# Patient Record
Sex: FEMALE | Race: WHITE | NOT HISPANIC OR LATINO | Employment: UNEMPLOYED | URBAN - NONMETROPOLITAN AREA
[De-identification: names, ages, dates, MRNs, and addresses within clinical notes are randomized per-mention and may not be internally consistent; named-entity substitution may affect disease eponyms.]

---
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
## 2023-02-15 NOTE — PROGRESS NOTES
Assessment & Plan     ELIZABETH (generalized anxiety disorder)  Depression, unspecified depression type  Mixed obsessional thoughts and acts  Worsening anxiety, stop Celexa as she felt it was not working.  Has failed Lexapro and Zoloft in the past  Concerned about OCD with concurrent anxiety and depression.  A lot of thoughts compulsions, followed by need to complete actions.  Recommend trial of Prozac.  Discussed risk and benefits.  Start at 10 mg, increase to 20 mg at 3 weeks if minimal benefit and tolerating.  Recommended therapy and diagnostic assessment, referral placed to [facility].  Consider pharmacogenetics testing if Prozac fails.  Follow-up in 4 weeks, sooner if needed.  - FLUoxetine (PROZAC) 10 MG capsule; Take 1 capsule (10 mg) by mouth daily  - Adult Mental Health  Referral; Future    Iron deficiency  Restart iron supplement.  Recheck iron level at next appointment.  Consider something for heavy menses, progesterone only birth control, in the future.  - ferrous sulfate (FEROSUL) 325 (65 Fe) MG tablet; Take 1 tablet (325 mg) by mouth every other day Do not take with PPI or antacid (take Iron at least two hours before or four hours after antacid/PPI). Try to take with Orange juice or vitamin C to increase absorption. Do not take at same time as milk, calcium supplement, fiber, tea, coffee or eggs to increase absorption.    Vitamin D deficiency  Resume vitamin D supplement.  We will recheck level at next appointment.  - vitamin D3 (CHOLECALCIFEROL) 50 mcg (2000 units) tablet; Take 1 tablet (50 mcg) by mouth daily       Depression Screening Follow Up    PHQ 2/16/2023   PHQ-9 Total Score 14   Q9: Thoughts of better off dead/self-harm past 2 weeks Not at all   PHQ-A Total Score -   PHQ-A Depressed most days in past year -   PHQ-A Mood affect on daily activities -   PHQ-A Suicide Ideation past 2 weeks -   PHQ-A Suicide Ideation past month -   PHQ-A Previous suicide attempt -       Follow Up Actions  Taken  Crisis resource information provided in After Visit Summary  Mental Health Referral placed  Follow up recommended: new medication, fu planned       Return in about 4 weeks (around 3/16/2023) for Follow up, mental health.    Mago Perez MD  [facility] - MARIAM Penny is a 17 year old, presenting for the following health issues:  Anxiety      HPI     Anxiety Follow-Up    How are you doing with your anxiety since your last visit? Worsened- would also like to talk bout OCD     Are you having other symptoms that might be associated with anxiety? Yes:  not sleeping enough     Have you had a significant life event? No     Are you feeling depressed? Yes:  every day     Do you have any concerns with your use of alcohol or other drugs? No    Has been on multiple anxiety medications - feels like Celexa stopped working. Quit, then restarted and did not notice any difference. Was on for 2-3 years.   Has tried lexapro and Zoloft - didn't work well. Had increased suicidal thoughts. Tried wellbutrin before too.     Very anxious all the time - stomach hurts, diarrhea, nausea. Being out in public is hard, going far from home, being around strangers.     Depression has been pervasive since 5th grade. Has been isolated, allergic to sun.     Feels like she has OCD. Repetitive impulses thoughts, and she has to do them. Knocking on things, fear of germs. Has been shoving her finger in her nose, brain says she will die if she doesn't do it. Has never seen a therapist.     Does get heavy periods. Iron deficient in past. Ran out of iron.     Social History     Tobacco Use     Smoking status: Never     Smokeless tobacco: Never   Substance Use Topics     Alcohol use: Never     Alcohol/week: 0.0 standard drinks     Drug use: Never     ELIZABETH-7 SCORE 6/24/2021 11/16/2022 2/16/2023   Total Score 16 18 18     PHQ 6/24/2021 11/16/2022 2/16/2023   PHQ-9 Total Score 18 14 14   Q9: Thoughts of better off  "dead/self-harm past 2 weeks Not at all Not at all Not at all   PHQ-A Total Score - - -   PHQ-A Depressed most days in past year - - -   PHQ-A Mood affect on daily activities - - -   PHQ-A Suicide Ideation past 2 weeks - - -   PHQ-A Suicide Ideation past month - - -   PHQ-A Previous suicide attempt - - -     Last PHQ-9 2/16/2023   1.  Little interest or pleasure in doing things 3   2.  Feeling down, depressed, or hopeless 2   3.  Trouble falling or staying asleep, or sleeping too much 3   4.  Feeling tired or having little energy 2   5.  Poor appetite or overeating 0   6.  Feeling bad about yourself 2   7.  Trouble concentrating 2   8.  Moving slowly or restless 0   Q9: Thoughts of better off dead/self-harm past 2 weeks 0   PHQ-9 Total Score 14   Difficulty at work, home, or with people Not difficult at all     ELIZABETH-7  2/16/2023   1. Feeling nervous, anxious, or on edge 3   2. Not being able to stop or control worrying 3   3. Worrying too much about different things 3   4. Trouble relaxing 2   5. Being so restless that it is hard to sit still 2   6. Becoming easily annoyed or irritable 2   7. Feeling afraid, as if something awful might happen 3   ELIZABETH-7 Total Score 18   If you checked any problems, how difficult have they made it for you to do your work, take care of things at home, or get along with other people? Not difficult at all         Objective    BP 98/69 (BP Location: Left arm, Patient Position: Sitting, Cuff Size: Adult Regular)   Pulse 86   Temp 98  F (36.7  C) (Tympanic)   Ht 1.575 m (5' 2\")   Wt 48.7 kg (107 lb 4.8 oz)   SpO2 99%   BMI 19.63 kg/m    Body mass index is 19.63 kg/m .     Physical Exam  Constitutional:       General: She is not in acute distress.     Appearance: Normal appearance. She is not ill-appearing.   Pulmonary:      Effort: Pulmonary effort is normal.   Skin:     General: Skin is warm and dry.   Neurological:      General: No focal deficit present.      Mental Status: She is " alert.   Psychiatric:         Mood and Affect: Mood normal.         Behavior: Behavior normal.         Thought Content: Thought content normal.         Judgment: Judgment normal.

## 2023-02-16 ENCOUNTER — OFFICE VISIT (OUTPATIENT)
Dept: FAMILY MEDICINE | Facility: OTHER | Age: 18
End: 2023-02-16
Attending: STUDENT IN AN ORGANIZED HEALTH CARE EDUCATION/TRAINING PROGRAM
Payer: COMMERCIAL

## 2023-02-16 VITALS
DIASTOLIC BLOOD PRESSURE: 69 MMHG | SYSTOLIC BLOOD PRESSURE: 98 MMHG | TEMPERATURE: 98 F | WEIGHT: 107.3 LBS | BODY MASS INDEX: 19.75 KG/M2 | OXYGEN SATURATION: 99 % | HEIGHT: 62 IN | HEART RATE: 86 BPM

## 2023-02-16 DIAGNOSIS — F32.A DEPRESSION, UNSPECIFIED DEPRESSION TYPE: ICD-10-CM

## 2023-02-16 DIAGNOSIS — F42.2 MIXED OBSESSIONAL THOUGHTS AND ACTS: ICD-10-CM

## 2023-02-16 DIAGNOSIS — F41.1 GAD (GENERALIZED ANXIETY DISORDER): Primary | ICD-10-CM

## 2023-02-16 DIAGNOSIS — E55.9 VITAMIN D DEFICIENCY: ICD-10-CM

## 2023-02-16 DIAGNOSIS — E61.1 IRON DEFICIENCY: ICD-10-CM

## 2023-02-16 PROBLEM — N92.0 MENORRHAGIA: Status: ACTIVE | Noted: 2023-02-16

## 2023-02-16 PROCEDURE — 99214 OFFICE O/P EST MOD 30 MIN: CPT | Performed by: STUDENT IN AN ORGANIZED HEALTH CARE EDUCATION/TRAINING PROGRAM

## 2023-02-16 RX ORDER — FLUOXETINE 10 MG/1
10 CAPSULE ORAL DAILY
Qty: 30 CAPSULE | Refills: 1 | Status: SHIPPED | OUTPATIENT
Start: 2023-02-16 | End: 2023-03-27

## 2023-02-16 RX ORDER — FERROUS SULFATE 325(65) MG
325 TABLET ORAL EVERY OTHER DAY
Qty: 30 TABLET | Refills: 1 | Status: SHIPPED | OUTPATIENT
Start: 2023-02-16 | End: 2023-06-07

## 2023-02-16 RX ORDER — CHOLECALCIFEROL (VITAMIN D3) 50 MCG
1 TABLET ORAL DAILY
Qty: 90 TABLET | Refills: 1 | Status: SHIPPED | OUTPATIENT
Start: 2023-02-16 | End: 2023-08-24

## 2023-02-16 ASSESSMENT — ANXIETY QUESTIONNAIRES
6. BECOMING EASILY ANNOYED OR IRRITABLE: MORE THAN HALF THE DAYS
1. FEELING NERVOUS, ANXIOUS, OR ON EDGE: NEARLY EVERY DAY
GAD7 TOTAL SCORE: 18
5. BEING SO RESTLESS THAT IT IS HARD TO SIT STILL: MORE THAN HALF THE DAYS
GAD7 TOTAL SCORE: 18
2. NOT BEING ABLE TO STOP OR CONTROL WORRYING: NEARLY EVERY DAY
IF YOU CHECKED OFF ANY PROBLEMS ON THIS QUESTIONNAIRE, HOW DIFFICULT HAVE THESE PROBLEMS MADE IT FOR YOU TO DO YOUR WORK, TAKE CARE OF THINGS AT HOME, OR GET ALONG WITH OTHER PEOPLE: NOT DIFFICULT AT ALL
3. WORRYING TOO MUCH ABOUT DIFFERENT THINGS: NEARLY EVERY DAY
7. FEELING AFRAID AS IF SOMETHING AWFUL MIGHT HAPPEN: NEARLY EVERY DAY

## 2023-02-16 ASSESSMENT — PATIENT HEALTH QUESTIONNAIRE - PHQ9
SUM OF ALL RESPONSES TO PHQ QUESTIONS 1-9: 14
5. POOR APPETITE OR OVEREATING: MORE THAN HALF THE DAYS

## 2023-02-16 ASSESSMENT — PAIN SCALES - GENERAL: PAINLEVEL: NO PAIN (0)

## 2023-02-16 NOTE — PATIENT INSTRUCTIONS
Start Prozac 10mg once daily. If at 2-3 weeks, no change in any symptoms, increase to 20mg and let me know via [facility] [employer]. Follow-up at 4 weeks. Reach out sooner with any concerns.   Start vitamin D again. Will check levels at next appointment.   Start iron again. Will check at next appointment.         Psychologists/ Counselors                        Haroldo Orourke          ...            ..[phone]  [facility]                    ..  [phone]  [facility]                 [phone]  [facility] (kids)       .         [phone]  [facility](teens)                ..[phone]  [facility]                    [phone]  [facility]                   [phone]  [facility]           ...      .. [phone]  [facility]                ...[phone]  [facility]     ...          ...[phone]  [facility]               [phone]   [facility]..            .[phone]  [facility]                       [phone]  [facility]               .    [phone]  [facility]                ..[phone]  [facility]     .      . ..[phone]     [facility]              .   [phone]  [facility]                   [phone]              [facility]              .   [phone]  [facility]              .  ...[phone]  [facility]              .   ..[phone]  [facility]           ...      .. [phone]  [facility]              . ..[phone]  [facility]              .    ..[phone]  [facility]              .     .. [phone]  [facility]                 ... [phone]  [facility]                         .[phone]  [facility]     .      . ..[phone]            [facility]              ..  [phone]                    [place]             . ... ..[phone]  [facility]              .      ..[phone]  [facility]              .     ..[phone]  [facility]        .      . [phone]  [facility]           .[phone]  [facility]         .    ..[phone]  [facility]             . ..  ..  [phone]  [facility]            [phone]  [facility]              ..[phone]  [facility]                     [phone]    [facility]           ..     .[phone]     [facility]    ...     ...[phone]     [facility]            [phone]                                                  [facility]                ..  .  [phone]  [facility]         .     .  [phone]  [facility] M[facility]ARITA Guz[facility]              . [phone]  [facility]  ..        [phone]  [facility]              .    [phone]    [facility]               ...  .  [phone]  [facility]             . [phone]  [facility]            [phone]  [facility]     .      . ..[phone]         *Facilities in bold italics indicate medication management  Services are offered.     Crisis support  Mobile crisis line- [phone]  [facility]: Free online resources including therapy for Mental Health and substance use problems: Http://thriverange.org     Crisis Text Line  Text HOME to 629-454 - The Crisis Text Line serves anyone, in any type of crisis, providing access to free, 24/7 support and information via the medium people already use and trust  http://www.crisistextline.org   Here's how it works:  Text HOME to 984-887 from anywhere in the [place], anytime, about any type of crisis.  A live, trained Crisis Counselor  receives the text and responds quickly.  The volunteer Crisis Counselor will help you move from a 'hot moment to a cool moment'

## 2023-03-23 NOTE — PROGRESS NOTES
Assessment & Plan     Encounter to establish care  Reviewed medial history and problems.     ELIZABETH (generalized anxiety disorder)  Depression, unspecified depression type  15 % improvement per patient, mom has noticed also improvement with fluoxetine.  Still room for improvement, plan to increase to 20 mg.  She is engaging in counseling, did receive formal OCD diagnosis,  Although counselor is looking for someone for her that does specialize in OCD.  Tolerating fluoxetine with no adverse effects.  Follow-up again in 4 weeks, sooner if needed.  - FLUoxetine (PROZAC) 20 MG capsule; Take 1 capsule (20 mg) by mouth daily    Mixed obsessional thoughts and acts  Will benefit from counseling, in addition to slightly higher dose of Prozac.    EPP (erythropoietic protoporphyria) (H)  Reviewed history.  Requires annual monitoring of levels, LFTs, CBC, and ferritin.  Also found vitamin D deficiency, is on supplementation.  New referral placed [facility] hematology at [facility].  Does also need to have her gallbladder out for gallstones, phone number given for general surgery so she can reschedule this.  Update labs today, CBC and CMP were done in November 2022.  - Adult Oncology/Hematology  Referral; Future  - Vitamin D Deficiency  - Ferritin    Pulsatile tinnitus of right ear  Concern for vascular etiology.  Unclear if hearing loss component, that she has not noticed, but does have the unilateral pulsing tinnitus ongoing for 2 years.  Recommend audiology testing, MRI of the auditory canal, and ENT evaluation.  Mom and patient were in agreement.  - Adult Audiology  Referral; Future  - MR Angiography [place] of card  - Adult ENT  Referral; Future      Mago Perez MD  [facility] - MARIAM Penny is a 17 year old, presenting for the following health issues:  Establish Care, Depression, and Anxiety    HPI   Establish care     Depression and Anxiety Follow-Up    How are  you doing with your depression since your last visit? No change - 15% improvement in anxiety    How are you doing with your anxiety since your last visit?  Improved     Are you having other symptoms that might be associated with depression or anxiety? No    Have you had a significant life event? No     Do you have any concerns with your use of alcohol or other drugs? No    Anxiety a little better - less panic with going out. 15% improvement.   Mom has noticed her being more talkative. Less crying phone calls.   Therapist - diagnosed OCD. Seeing [facility] online. Every two weeks.   Depression has been very chronic.     Porphyria -has not seen specialist in a few years.  Supposed to establish with adult hematology.  Mom thinks that there was a hematologist that specialist in this in [place].    Pulsing in right ear - ongoing 2 plus years. Always there. Feels like a pulsing. Does get ringing in right ear. No hearing loss.     Social History     Tobacco Use     Smoking status: Never     Smokeless tobacco: Never   Substance Use Topics     Alcohol use: Never     Alcohol/week: 0.0 standard drinks     Drug use: Never     PHQ 11/16/2022 2/16/2023 3/27/2023   PHQ-9 Total Score 14 14 17   Q9: Thoughts of better off dead/self-harm past 2 weeks Not at all Not at all Not at all   PHQ-A Total Score - - -   PHQ-A Depressed most days in past year - - -   PHQ-A Mood affect on daily activities - - -   PHQ-A Suicide Ideation past 2 weeks - - -   PHQ-A Suicide Ideation past month - - -   PHQ-A Previous suicide attempt - - -     ELIZABETH-7 SCORE 11/16/2022 2/16/2023 3/27/2023   Total Score - - 16 (severe anxiety)   Total Score 18 18 16     Last PHQ-9 3/27/2023   1.  Little interest or pleasure in doing things 3   2.  Feeling down, depressed, or hopeless 2   3.  Trouble falling or staying asleep, or sleeping too much 2   4.  Feeling tired or having little energy 2   5.  Poor appetite or overeating 2   6.  Feeling bad about yourself 2   7.   "Trouble concentrating 2   8.  Moving slowly or restless 2   Q9: Thoughts of better off dead/self-harm past 2 weeks 0   PHQ-9 Total Score 17   Difficulty at work, home, or with people -     ELIZABETH-7  3/27/2023   1. Feeling nervous, anxious, or on edge 2   2. Not being able to stop or control worrying 2   3. Worrying too much about different things 3   4. Trouble relaxing 3   5. Being so restless that it is hard to sit still 2   6. Becoming easily annoyed or irritable 2   7. Feeling afraid, as if something awful might happen 2   ELIZABETH-7 Total Score 16   If you checked any problems, how difficult have they made it for you to do your work, take care of things at home, or get along with other people? Somewhat difficult       How many servings of fruits and vegetables do you eat daily?  2-3    On average, how many sweetened beverages do you drink each day (Examples: soda, juice, sweet tea, etc.  Do NOT count diet or artificially sweetened beverages)?   1    How many days per week do you exercise enough to make your heart beat faster? 3 or less    How many minutes a day do you exercise enough to make your heart beat faster? 9 or less    How many days per week do you miss taking your medication? 0        Objective    BP 98/62   Pulse 86   Temp 98.6  F (37  C) (Tympanic)   Resp 18   Ht 1.575 m (5' 2\")   Wt 47.3 kg (104 lb 4.8 oz)   SpO2 99%   BMI 19.08 kg/m    Body mass index is 19.08 kg/m .  Physical Exam  Constitutional:       General: She is not in acute distress.     Appearance: Normal appearance. She is not ill-appearing.   HENT:      Right Ear: Tympanic membrane and external ear normal.      Left Ear: Tympanic membrane and external ear normal.      Nose: No mucosal edema, congestion or rhinorrhea.      Right Sinus: No maxillary sinus tenderness or frontal sinus tenderness.      Left Sinus: No maxillary sinus tenderness or frontal sinus tenderness.      Mouth/Throat:      Mouth: Mucous membranes are moist.      " Pharynx: Oropharynx is clear. No oropharyngeal exudate or posterior oropharyngeal erythema.      Tonsils: No tonsillar exudate or tonsillar abscesses.   Eyes:      General: No scleral icterus.        Right eye: No discharge.         Left eye: No discharge.      Extraocular Movements: Extraocular movements intact.      Conjunctiva/sclera: Conjunctivae normal.      Pupils: Pupils are equal, round, and reactive to light.   Cardiovascular:      Rate and Rhythm: Normal rate and regular rhythm.      Heart sounds: No murmur heard.  Pulmonary:      Effort: Pulmonary effort is normal.      Breath sounds: Normal breath sounds. No wheezing, rhonchi or rales.   Musculoskeletal:      Cervical back: Normal range of motion and neck supple. No tenderness.   Lymphadenopathy:      Cervical: No cervical adenopathy.   Skin:     General: Skin is warm and dry.      Capillary Refill: Capillary refill takes less than 2 seconds.   Neurological:      General: No focal deficit present.      Mental Status: She is alert and oriented to person, place, and time.      Gait: Gait normal.   Psychiatric:         Mood and Affect: Mood normal.         Behavior: Behavior normal.         Thought Content: Thought content normal.         Judgment: Judgment normal.            No results found for this or any previous visit (from the past 24 hour(s)).              Answers for HPI/ROS submitted by the patient on 3/27/2023  If you checked off any problems, how difficult have these problems made it for you to do your work, take care of things at home, or get along with other people?: Very difficult  PHQ9 TOTAL SCORE: 17  ELIZABETH 7 TOTAL SCORE: 16

## 2023-03-27 ENCOUNTER — OFFICE VISIT (OUTPATIENT)
Dept: FAMILY MEDICINE | Facility: OTHER | Age: 18
End: 2023-03-27
Attending: STUDENT IN AN ORGANIZED HEALTH CARE EDUCATION/TRAINING PROGRAM
Payer: COMMERCIAL

## 2023-03-27 VITALS
WEIGHT: 104.3 LBS | OXYGEN SATURATION: 99 % | SYSTOLIC BLOOD PRESSURE: 98 MMHG | HEIGHT: 62 IN | RESPIRATION RATE: 18 BRPM | BODY MASS INDEX: 19.19 KG/M2 | TEMPERATURE: 98.6 F | HEART RATE: 86 BPM | DIASTOLIC BLOOD PRESSURE: 62 MMHG

## 2023-03-27 DIAGNOSIS — F42.2 MIXED OBSESSIONAL THOUGHTS AND ACTS: ICD-10-CM

## 2023-03-27 DIAGNOSIS — F32.A DEPRESSION, UNSPECIFIED DEPRESSION TYPE: ICD-10-CM

## 2023-03-27 DIAGNOSIS — Z76.89 ENCOUNTER TO ESTABLISH CARE: Primary | ICD-10-CM

## 2023-03-27 DIAGNOSIS — E80.0 EPP (ERYTHROPOIETIC PROTOPORPHYRIA) (H): Chronic | ICD-10-CM

## 2023-03-27 DIAGNOSIS — F41.1 GAD (GENERALIZED ANXIETY DISORDER): ICD-10-CM

## 2023-03-27 DIAGNOSIS — H93.A1 PULSATILE TINNITUS OF RIGHT EAR: ICD-10-CM

## 2023-03-27 LAB — FERRITIN SERPL-MCNC: 22 NG/ML (ref 6–175)

## 2023-03-27 PROCEDURE — 36415 COLL VENOUS BLD VENIPUNCTURE: CPT | Performed by: STUDENT IN AN ORGANIZED HEALTH CARE EDUCATION/TRAINING PROGRAM

## 2023-03-27 PROCEDURE — 82306 VITAMIN D 25 HYDROXY: CPT | Performed by: STUDENT IN AN ORGANIZED HEALTH CARE EDUCATION/TRAINING PROGRAM

## 2023-03-27 PROCEDURE — 82728 ASSAY OF FERRITIN: CPT | Performed by: STUDENT IN AN ORGANIZED HEALTH CARE EDUCATION/TRAINING PROGRAM

## 2023-03-27 PROCEDURE — 99214 OFFICE O/P EST MOD 30 MIN: CPT | Performed by: STUDENT IN AN ORGANIZED HEALTH CARE EDUCATION/TRAINING PROGRAM

## 2023-03-27 ASSESSMENT — PATIENT HEALTH QUESTIONNAIRE - PHQ9
10. IF YOU CHECKED OFF ANY PROBLEMS, HOW DIFFICULT HAVE THESE PROBLEMS MADE IT FOR YOU TO DO YOUR WORK, TAKE CARE OF THINGS AT HOME, OR GET ALONG WITH OTHER PEOPLE: VERY DIFFICULT
SUM OF ALL RESPONSES TO PHQ QUESTIONS 1-9: 17
SUM OF ALL RESPONSES TO PHQ QUESTIONS 1-9: 17

## 2023-03-27 ASSESSMENT — ANXIETY QUESTIONNAIRES
7. FEELING AFRAID AS IF SOMETHING AWFUL MIGHT HAPPEN: MORE THAN HALF THE DAYS
1. FEELING NERVOUS, ANXIOUS, OR ON EDGE: MORE THAN HALF THE DAYS
3. WORRYING TOO MUCH ABOUT DIFFERENT THINGS: NEARLY EVERY DAY
GAD7 TOTAL SCORE: 16
8. IF YOU CHECKED OFF ANY PROBLEMS, HOW DIFFICULT HAVE THESE MADE IT FOR YOU TO DO YOUR WORK, TAKE CARE OF THINGS AT HOME, OR GET ALONG WITH OTHER PEOPLE?: SOMEWHAT DIFFICULT
2. NOT BEING ABLE TO STOP OR CONTROL WORRYING: MORE THAN HALF THE DAYS
GAD7 TOTAL SCORE: 16
GAD7 TOTAL SCORE: 16
5. BEING SO RESTLESS THAT IT IS HARD TO SIT STILL: MORE THAN HALF THE DAYS
IF YOU CHECKED OFF ANY PROBLEMS ON THIS QUESTIONNAIRE, HOW DIFFICULT HAVE THESE PROBLEMS MADE IT FOR YOU TO DO YOUR WORK, TAKE CARE OF THINGS AT HOME, OR GET ALONG WITH OTHER PEOPLE: SOMEWHAT DIFFICULT
6. BECOMING EASILY ANNOYED OR IRRITABLE: MORE THAN HALF THE DAYS
7. FEELING AFRAID AS IF SOMETHING AWFUL MIGHT HAPPEN: MORE THAN HALF THE DAYS
4. TROUBLE RELAXING: NEARLY EVERY DAY

## 2023-03-27 ASSESSMENT — PAIN SCALES - GENERAL: PAINLEVEL: NO PAIN (0)

## 2023-03-27 NOTE — LETTER
My Depression Action Plan  Name: Hemalatha Driscoll   Date of Birth 2005  Date: 3/23/2023    My doctor: Jessica Baumann   My clinic: [facility] [place]  [address]  [place] [address]  [phone]          GREEN    ZONE   Good Control    What it looks like:     Things are going generally well. You have normal ups and downs. You may even feel depressed from time to time, but bad moods usually last less than a day.   What you need to do:  1. Continue to care for yourself (see self care plan)  2. Check your depression survival kit and update it as needed  3. Follow your physician s recommendations including any medication.  4. Do not stop taking medication unless you consult with your physician first.           YELLOW         ZONE Getting Worse    What it looks like:     Depression is starting to interfere with your life.     It may be hard to get out of bed; you may be starting to isolate yourself from others.    Symptoms of depression are starting to last most all day and this has happened for several days.     You may have suicidal thoughts but they are not constant.   What you need to do:     1. Call your care team. Your response to treatment will improve if you keep your care team informed of your progress. Yellow periods are signs an adjustment may need to be made.     2. Continue your self-care.  Just get dressed and ready for the day.  Don't give yourself time to talk yourself out of it.    3. Talk to someone in your support network.    4. Open up your Depression Self-Care Plan/Wellness Kit.           RED    ZONE Medical Alert - Get Help    What it looks like:     Depression is seriously interfering with your life.     You may experience these or other symptoms: You can t get out of bed most days, can t work or engage in other necessary activities, you have trouble taking care of basic hygiene, or basic responsibilities, thoughts of suicide or death that will not go away,  self-injurious behavior.     What you need to do:  1. Call your care team and request a same-day appointment. If they are not available (weekends or after hours) call your local crisis line, emergency room or 911.          Depression Self-Care Plan / Wellness Kit    Many people find that medication and therapy are helpful treatments for managing depression. In addition, making small changes to your everyday life can help to boost your mood and improve your wellbeing. Below are some tips for you to consider. Be sure to talk with your medical provider and/or behavioral health consultant if your symptoms are worsening or not improving.     Sleep   Sleep hygiene  means all of the habits that support good, restful sleep. It includes maintaining a consistent bedtime and wake time, using your bedroom only for sleeping or sex, and keeping the bedroom dark and free of distractions like a computer, smartphone, or television.     Develop a Healthy Routine  Maintain good hygiene. Get out of bed in the morning, make your bed, brush your teeth, take a shower, and get dressed. Don t spend too much time viewing media that makes you feel stressed. Find time to relax each day.    Exercise  Get some form of exercise every day. This will help reduce pain and release endorphins, the  feel good  chemicals in your brain. It can be as simple as just going for a walk or doing some gardening, anything that will get you moving.      Diet  Strive to eat healthy foods, including fruits and vegetables. Drink plenty of water. Avoid excessive sugar, caffeine, alcohol, and other mood-altering substances.     Stay Connected with Others  Stay in touch with friends and family members.    Manage Your Mood  Try deep breathing, massage therapy, biofeedback, or meditation. Take part in fun activities when you can. Try to find something to smile about each day.     Psychotherapy  Be open to working with a therapist if your provider recommends it.      Medication  Be sure to take your medication as prescribed. Most anti-depressants need to be taken every day. It usually takes several weeks for medications to work. Not all medicines work for all people. It is important to follow-up with your provider to make sure you have a treatment plan that is working for you. Do not stop your medication abruptly without first discussing it with your provider.    Crisis Resources   These hotlines are for both adults and children. They and are open 24 hours a day, 7 days a week unless noted otherwise.      National Suicide Prevention Lifeline   988 or 4-997-558-JNBV (1282)      Crisis Text Line    www.crisistextline.org  Text HOME to 671721 from anywhere in the [place], anytime, about any type of crisis. A live, trained crisis counselor will receive the text and respond quickly.      [facility] Lifeline for LGBTQ Youth  A national crisis intervention and suicide lifeline for LGBTQ youth under 25. Provides a safe place to talk without judgement. Call 1-963.470.2851; text START to 184261 or visit www.thetrevorproject.org to talk to a trained counselor.      For Critical access hospital crisis numbers, visit the [facility] website at:  https://[place].gov/dhs/people-we-serve/adults/health-care/mental-health/resources/crisis-contacts.jsp

## 2023-03-28 ENCOUNTER — TELEPHONE (OUTPATIENT)
Dept: ONCOLOGY | Facility: OTHER | Age: 18
End: 2023-03-28

## 2023-03-29 LAB — DEPRECATED CALCIDIOL+CALCIFEROL SERPL-MC: 27 UG/L (ref 20–75)

## 2023-03-30 ENCOUNTER — TELEPHONE (OUTPATIENT)
Dept: AUDIOLOGY | Facility: OTHER | Age: 18
End: 2023-03-30

## 2023-03-30 NOTE — TELEPHONE ENCOUNTER
Patient was called 3 times and voicemails left to schedule appt from referrals for ENT/ Audiology Department.    3/30-letter was sent to patient    Davida Jameson

## 2023-04-19 PROCEDURE — 99285 EMERGENCY DEPT VISIT HI MDM: CPT | Mod: 25

## 2023-04-19 PROCEDURE — 81003 URINALYSIS AUTO W/O SCOPE: CPT | Performed by: FAMILY MEDICINE

## 2023-04-19 PROCEDURE — 99285 EMERGENCY DEPT VISIT HI MDM: CPT | Performed by: STUDENT IN AN ORGANIZED HEALTH CARE EDUCATION/TRAINING PROGRAM

## 2023-04-19 PROCEDURE — 81025 URINE PREGNANCY TEST: CPT | Performed by: FAMILY MEDICINE

## 2023-04-20 ENCOUNTER — APPOINTMENT (OUTPATIENT)
Dept: CT IMAGING | Facility: HOSPITAL | Age: 18
End: 2023-04-20
Attending: FAMILY MEDICINE
Payer: COMMERCIAL

## 2023-04-20 ENCOUNTER — APPOINTMENT (OUTPATIENT)
Dept: ULTRASOUND IMAGING | Facility: HOSPITAL | Age: 18
End: 2023-04-20
Attending: FAMILY MEDICINE
Payer: COMMERCIAL

## 2023-04-20 ENCOUNTER — HOSPITAL ENCOUNTER (EMERGENCY)
Facility: HOSPITAL | Age: 18
Discharge: ANOTHER HOSPITAL | End: 2023-04-21
Attending: FAMILY MEDICINE | Admitting: STUDENT IN AN ORGANIZED HEALTH CARE EDUCATION/TRAINING PROGRAM
Payer: COMMERCIAL

## 2023-04-20 VITALS
HEART RATE: 79 BPM | DIASTOLIC BLOOD PRESSURE: 61 MMHG | TEMPERATURE: 98.5 F | OXYGEN SATURATION: 98 % | RESPIRATION RATE: 18 BRPM | SYSTOLIC BLOOD PRESSURE: 99 MMHG

## 2023-04-20 DIAGNOSIS — K81.0 ACUTE CHOLECYSTITIS: ICD-10-CM

## 2023-04-20 LAB
ALBUMIN SERPL BCG-MCNC: 4.4 G/DL (ref 3.5–5.2)
ALBUMIN UR-MCNC: NEGATIVE MG/DL
ALP SERPL-CCNC: 55 U/L (ref 35–104)
ALT SERPL W P-5'-P-CCNC: 16 U/L (ref 10–35)
ANION GAP SERPL CALCULATED.3IONS-SCNC: 10 MMOL/L (ref 7–15)
APPEARANCE UR: CLEAR
AST SERPL W P-5'-P-CCNC: 26 U/L (ref 10–35)
BACTERIA #/AREA URNS HPF: ABNORMAL /HPF
BASOPHILS # BLD AUTO: 0 10E3/UL (ref 0–0.2)
BASOPHILS NFR BLD AUTO: 0 %
BILIRUB SERPL-MCNC: 0.3 MG/DL
BILIRUB UR QL STRIP: NEGATIVE
BUN SERPL-MCNC: 13.9 MG/DL (ref 6–20)
CALCIUM SERPL-MCNC: 9.2 MG/DL (ref 8.6–10)
CHLORIDE SERPL-SCNC: 101 MMOL/L (ref 98–107)
COLOR UR AUTO: ABNORMAL
CREAT SERPL-MCNC: 0.61 MG/DL (ref 0.51–0.95)
CRP SERPL-MCNC: <3 MG/L
DEPRECATED HCO3 PLAS-SCNC: 27 MMOL/L (ref 22–29)
EOSINOPHIL # BLD AUTO: 0.1 10E3/UL (ref 0–0.7)
EOSINOPHIL NFR BLD AUTO: 1 %
ERYTHROCYTE [DISTWIDTH] IN BLOOD BY AUTOMATED COUNT: 17 % (ref 10–15)
GFR SERPL CREATININE-BSD FRML MDRD: >90 ML/MIN/1.73M2
GLUCOSE SERPL-MCNC: 108 MG/DL (ref 70–99)
GLUCOSE UR STRIP-MCNC: NEGATIVE MG/DL
HCG UR QL: NEGATIVE
HCT VFR BLD AUTO: 37.3 % (ref 35–47)
HGB BLD-MCNC: 12.2 G/DL (ref 11.7–15.7)
HGB UR QL STRIP: NEGATIVE
IMM GRANULOCYTES # BLD: 0 10E3/UL
IMM GRANULOCYTES NFR BLD: 0 %
KETONES UR STRIP-MCNC: NEGATIVE MG/DL
LACTATE SERPL-SCNC: 1.5 MMOL/L (ref 0.7–2)
LEUKOCYTE ESTERASE UR QL STRIP: NEGATIVE
LIPASE SERPL-CCNC: 26 U/L (ref 13–60)
LYMPHOCYTES # BLD AUTO: 2.2 10E3/UL (ref 0.8–5.3)
LYMPHOCYTES NFR BLD AUTO: 25 %
MCH RBC QN AUTO: 24.8 PG (ref 26.5–33)
MCHC RBC AUTO-ENTMCNC: 32.7 G/DL (ref 31.5–36.5)
MCV RBC AUTO: 76 FL (ref 78–100)
MONOCYTES # BLD AUTO: 0.5 10E3/UL (ref 0–1.3)
MONOCYTES NFR BLD AUTO: 5 %
MUCOUS THREADS #/AREA URNS LPF: PRESENT /LPF
NEUTROPHILS # BLD AUTO: 5.9 10E3/UL (ref 1.6–8.3)
NEUTROPHILS NFR BLD AUTO: 69 %
NITRATE UR QL: NEGATIVE
NRBC # BLD AUTO: 0 10E3/UL
NRBC BLD AUTO-RTO: 0 /100
PH UR STRIP: 6.5 [PH] (ref 4.7–8)
PLATELET # BLD AUTO: 115 10E3/UL (ref 150–450)
POTASSIUM SERPL-SCNC: 3.6 MMOL/L (ref 3.4–5.3)
PROT SERPL-MCNC: 7.2 G/DL (ref 6.4–8.3)
RBC # BLD AUTO: 4.91 10E6/UL (ref 3.8–5.2)
RBC URINE: <1 /HPF
SODIUM SERPL-SCNC: 138 MMOL/L (ref 136–145)
SP GR UR STRIP: 1.02 (ref 1–1.03)
SQUAMOUS EPITHELIAL: 0 /HPF
UROBILINOGEN UR STRIP-MCNC: NORMAL MG/DL
WBC # BLD AUTO: 8.8 10E3/UL (ref 4–11)
WBC URINE: 1 /HPF

## 2023-04-20 PROCEDURE — 258N000003 HC RX IP 258 OP 636: Performed by: FAMILY MEDICINE

## 2023-04-20 PROCEDURE — 36415 COLL VENOUS BLD VENIPUNCTURE: CPT | Performed by: FAMILY MEDICINE

## 2023-04-20 PROCEDURE — 74177 CT ABD & PELVIS W/CONTRAST: CPT

## 2023-04-20 PROCEDURE — 250N000011 HC RX IP 250 OP 636: Performed by: FAMILY MEDICINE

## 2023-04-20 PROCEDURE — 76705 ECHO EXAM OF ABDOMEN: CPT

## 2023-04-20 PROCEDURE — 83605 ASSAY OF LACTIC ACID: CPT | Performed by: FAMILY MEDICINE

## 2023-04-20 PROCEDURE — 85025 COMPLETE CBC W/AUTO DIFF WBC: CPT | Performed by: FAMILY MEDICINE

## 2023-04-20 PROCEDURE — 86140 C-REACTIVE PROTEIN: CPT | Performed by: FAMILY MEDICINE

## 2023-04-20 PROCEDURE — 250N000013 HC RX MED GY IP 250 OP 250 PS 637: Performed by: STUDENT IN AN ORGANIZED HEALTH CARE EDUCATION/TRAINING PROGRAM

## 2023-04-20 PROCEDURE — 83690 ASSAY OF LIPASE: CPT | Performed by: FAMILY MEDICINE

## 2023-04-20 PROCEDURE — 96375 TX/PRO/DX INJ NEW DRUG ADDON: CPT

## 2023-04-20 PROCEDURE — 80053 COMPREHEN METABOLIC PANEL: CPT | Performed by: FAMILY MEDICINE

## 2023-04-20 PROCEDURE — 96365 THER/PROPH/DIAG IV INF INIT: CPT | Mod: XU

## 2023-04-20 PROCEDURE — 96361 HYDRATE IV INFUSION ADD-ON: CPT

## 2023-04-20 PROCEDURE — 250N000011 HC RX IP 250 OP 636: Performed by: STUDENT IN AN ORGANIZED HEALTH CARE EDUCATION/TRAINING PROGRAM

## 2023-04-20 PROCEDURE — 96366 THER/PROPH/DIAG IV INF ADDON: CPT

## 2023-04-20 PROCEDURE — 96374 THER/PROPH/DIAG INJ IV PUSH: CPT

## 2023-04-20 RX ORDER — ONDANSETRON 4 MG/1
4 TABLET, ORALLY DISINTEGRATING ORAL ONCE
Status: DISCONTINUED | OUTPATIENT
Start: 2023-04-20 | End: 2023-04-20

## 2023-04-20 RX ORDER — HYDROMORPHONE HYDROCHLORIDE 1 MG/ML
0.5 INJECTION, SOLUTION INTRAMUSCULAR; INTRAVENOUS; SUBCUTANEOUS ONCE
Status: COMPLETED | OUTPATIENT
Start: 2023-04-20 | End: 2023-04-20

## 2023-04-20 RX ORDER — FERROUS SULFATE 325(65) MG
325 TABLET ORAL EVERY OTHER DAY
Status: DISCONTINUED | OUTPATIENT
Start: 2023-04-20 | End: 2023-04-21 | Stop reason: HOSPADM

## 2023-04-20 RX ORDER — SODIUM CHLORIDE 9 MG/ML
INJECTION, SOLUTION INTRAVENOUS CONTINUOUS
Status: DISCONTINUED | OUTPATIENT
Start: 2023-04-20 | End: 2023-04-21 | Stop reason: HOSPADM

## 2023-04-20 RX ORDER — VITAMIN B COMPLEX
50 TABLET ORAL DAILY
Status: DISCONTINUED | OUTPATIENT
Start: 2023-04-20 | End: 2023-04-21 | Stop reason: HOSPADM

## 2023-04-20 RX ORDER — MORPHINE SULFATE 4 MG/ML
4 INJECTION, SOLUTION INTRAMUSCULAR; INTRAVENOUS
Status: COMPLETED | OUTPATIENT
Start: 2023-04-20 | End: 2023-04-20

## 2023-04-20 RX ORDER — IOPAMIDOL 755 MG/ML
52 INJECTION, SOLUTION INTRAVASCULAR ONCE
Status: COMPLETED | OUTPATIENT
Start: 2023-04-20 | End: 2023-04-20

## 2023-04-20 RX ORDER — ONDANSETRON 2 MG/ML
4 INJECTION INTRAMUSCULAR; INTRAVENOUS ONCE
Status: COMPLETED | OUTPATIENT
Start: 2023-04-20 | End: 2023-04-20

## 2023-04-20 RX ADMIN — FERROUS SULFATE TAB 325 MG (65 MG ELEMENTAL FE) 325 MG: 325 (65 FE) TAB at 14:39

## 2023-04-20 RX ADMIN — Medication 50 MCG: at 14:39

## 2023-04-20 RX ADMIN — SODIUM CHLORIDE: 9 INJECTION, SOLUTION INTRAVENOUS at 04:03

## 2023-04-20 RX ADMIN — TAZOBACTAM SODIUM AND PIPERACILLIN SODIUM 3.38 G: 375; 3 INJECTION, SOLUTION INTRAVENOUS at 15:59

## 2023-04-20 RX ADMIN — MORPHINE SULFATE 4 MG: 4 INJECTION, SOLUTION INTRAMUSCULAR; INTRAVENOUS at 01:42

## 2023-04-20 RX ADMIN — SODIUM CHLORIDE: 9 INJECTION, SOLUTION INTRAVENOUS at 22:52

## 2023-04-20 RX ADMIN — TAZOBACTAM SODIUM AND PIPERACILLIN SODIUM 3.38 G: 375; 3 INJECTION, SOLUTION INTRAVENOUS at 08:40

## 2023-04-20 RX ADMIN — SODIUM CHLORIDE 1000 ML: 9 INJECTION, SOLUTION INTRAVENOUS at 01:42

## 2023-04-20 RX ADMIN — FLUOXETINE HYDROCHLORIDE 20 MG: 20 CAPSULE ORAL at 14:39

## 2023-04-20 RX ADMIN — IOPAMIDOL 52 ML: 755 INJECTION, SOLUTION INTRAVENOUS at 02:15

## 2023-04-20 RX ADMIN — SODIUM CHLORIDE: 9 INJECTION, SOLUTION INTRAVENOUS at 08:40

## 2023-04-20 RX ADMIN — TAZOBACTAM SODIUM AND PIPERACILLIN SODIUM 3.38 G: 375; 3 INJECTION, SOLUTION INTRAVENOUS at 22:21

## 2023-04-20 RX ADMIN — SODIUM CHLORIDE: 9 INJECTION, SOLUTION INTRAVENOUS at 14:43

## 2023-04-20 ASSESSMENT — ACTIVITIES OF DAILY LIVING (ADL)
ADLS_ACUITY_SCORE: 35

## 2023-04-20 NOTE — ED TRIAGE NOTES
"Pt reports RUQ pain, reports gallbladder hx \"I am having gall bladder attack.\"     Taken 5mg oxycodone 2030 with no relief. Pt shaking and in obvious pain in triage.       "

## 2023-04-20 NOTE — ED NOTES
Family asking about possibility of going home and returning if and when needed for surgery. Provider updated and in to speak with pt and family.

## 2023-04-20 NOTE — ED NOTES
Provider speaking with specialist regarding pt's EPP. Pt has had visitors in and out throughout the morning. Offers no c/o at this time.

## 2023-04-20 NOTE — DISCHARGE INSTRUCTIONS

## 2023-04-20 NOTE — CONSULTS
Emergency Room Consult  4/20/2023    Patient seen for abdominal pain, cholelithiasis, and acute cholecystitis noted to imaging.  Patient has a history of gallbladder problems.  She noted significant abdominal pain starting at 8 pm last night.  Patient has a past medical history significant for erythropoietic protoporphyria (EPP).  Anesthesia and hospitalist medical staff were consulted on the patient and her diagnosis of EPP and recommend transfer to the facility with hematology available.  This was discussed with the patient and Dr. Blanc.
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
## 2023-04-21 PROCEDURE — 96376 TX/PRO/DX INJ SAME DRUG ADON: CPT

## 2023-04-21 PROCEDURE — 250N000011 HC RX IP 250 OP 636: Performed by: FAMILY MEDICINE

## 2023-04-21 PROCEDURE — 258N000003 HC RX IP 258 OP 636: Performed by: FAMILY MEDICINE

## 2023-04-21 PROCEDURE — 96361 HYDRATE IV INFUSION ADD-ON: CPT

## 2023-04-21 RX ADMIN — TAZOBACTAM SODIUM AND PIPERACILLIN SODIUM 3.38 G: 375; 3 INJECTION, SOLUTION INTRAVENOUS at 06:05

## 2023-04-21 RX ADMIN — SODIUM CHLORIDE: 9 INJECTION, SOLUTION INTRAVENOUS at 07:27

## 2023-04-21 ASSESSMENT — ACTIVITIES OF DAILY LIVING (ADL)
ADLS_ACUITY_SCORE: 35

## 2023-04-21 NOTE — ED NOTES
Pt continues to deny pain or needs. Offered crackers and drink per provider OK but declined, relayed that she can not eat or drink after midnight. Call light remains within reach.

## 2023-04-21 NOTE — ED NOTES
Call to [facility] regarding approx surgery time. They do not have a set time until pt arrives. Pt updated.

## 2023-04-21 NOTE — ED NOTES
Patient slept all of shift, denied pain or other needs. No oral intake after midnight. Report given to WILLIE Pugh.

## 2023-04-25 ENCOUNTER — TELEPHONE (OUTPATIENT)
Dept: FAMILY MEDICINE | Facility: OTHER | Age: 18
End: 2023-04-25

## 2023-04-25 NOTE — TELEPHONE ENCOUNTER
Attempt # 1  Outcome: Left Message   Comment: LM for patient to return call to clinic to reschedule missed appointment with Dr. Perez on 4/24/23.

## 2023-06-03 ENCOUNTER — HEALTH MAINTENANCE LETTER (OUTPATIENT)
Age: 18
End: 2023-06-03

## 2023-06-04 DIAGNOSIS — E61.1 IRON DEFICIENCY: ICD-10-CM

## 2023-06-04 DIAGNOSIS — F41.1 GAD (GENERALIZED ANXIETY DISORDER): ICD-10-CM

## 2023-06-07 RX ORDER — FERROUS SULFATE 325(65) MG
325 TABLET ORAL EVERY OTHER DAY
Qty: 30 TABLET | Refills: 1 | Status: SHIPPED | OUTPATIENT
Start: 2023-06-07 | End: 2023-09-25

## 2023-08-22 DIAGNOSIS — E55.9 VITAMIN D DEFICIENCY: ICD-10-CM

## 2023-08-24 RX ORDER — CHOLECALCIFEROL (VITAMIN D3) 50 MCG
1 TABLET ORAL DAILY
Qty: 90 TABLET | Refills: 1 | Status: SHIPPED | OUTPATIENT
Start: 2023-08-24 | End: 2024-03-11

## 2023-08-24 NOTE — TELEPHONE ENCOUNTER
Vitamin D3      Last Written Prescription Date:  02/16/23  Last Fill Quantity: 90,   # refills: 1  Last Office Visit: 04/24/23  Future Office visit:       Routing refill request to provider for review/approval because:

## 2023-09-06 ENCOUNTER — MYC MEDICAL ADVICE (OUTPATIENT)
Dept: FAMILY MEDICINE | Facility: OTHER | Age: 18
End: 2023-09-06

## 2023-09-07 ENCOUNTER — OFFICE VISIT (OUTPATIENT)
Dept: FAMILY MEDICINE | Facility: OTHER | Age: 18
End: 2023-09-07
Attending: STUDENT IN AN ORGANIZED HEALTH CARE EDUCATION/TRAINING PROGRAM
Payer: COMMERCIAL

## 2023-09-07 VITALS
HEART RATE: 76 BPM | OXYGEN SATURATION: 98 % | WEIGHT: 122.38 LBS | DIASTOLIC BLOOD PRESSURE: 75 MMHG | TEMPERATURE: 98.7 F | SYSTOLIC BLOOD PRESSURE: 109 MMHG | BODY MASS INDEX: 22.38 KG/M2

## 2023-09-07 DIAGNOSIS — K21.9 GASTROESOPHAGEAL REFLUX DISEASE WITHOUT ESOPHAGITIS: ICD-10-CM

## 2023-09-07 DIAGNOSIS — K04.7 DENTAL INFECTION: Primary | ICD-10-CM

## 2023-09-07 DIAGNOSIS — F41.1 GAD (GENERALIZED ANXIETY DISORDER): ICD-10-CM

## 2023-09-07 DIAGNOSIS — F32.A DEPRESSION, UNSPECIFIED DEPRESSION TYPE: ICD-10-CM

## 2023-09-07 DIAGNOSIS — K03.81 CRACKED TOOTH: ICD-10-CM

## 2023-09-07 PROCEDURE — 99214 OFFICE O/P EST MOD 30 MIN: CPT | Performed by: STUDENT IN AN ORGANIZED HEALTH CARE EDUCATION/TRAINING PROGRAM

## 2023-09-07 RX ORDER — FLUOXETINE 10 MG/1
10 CAPSULE ORAL DAILY
Qty: 30 CAPSULE | Refills: 0 | Status: SHIPPED | OUTPATIENT
Start: 2023-09-07 | End: 2023-10-05

## 2023-09-07 ASSESSMENT — PAIN SCALES - GENERAL: PAINLEVEL: SEVERE PAIN (6)

## 2023-09-07 NOTE — PROGRESS NOTES
Assessment & Plan     Dental infection  Suspect early cellulitis of the right posterior molar.  No signs of abscess or significant deep space infection.  Recommend starting Augmentin twice daily with food.  Should also start probiotic.  Needs to get into a dentist for her dental decay/cracked tooth to prevent recurrence of the issue.  Has had trouble with her insurance.  Will place care coordination referral/social work referral to assist.  Did review that even though we are starting an oral antibiotic, symptoms could worsen with her tooth, and if she were develop inability to open her jaw, increased swelling, increased pain, fevers, or chills, would recommend emergent evaluation in the ED.  - amoxicillin-clavulanate (AUGMENTIN) 875-125 MG tablet; Take 1 tablet by mouth 2 times daily for 10 days Take with food.  - Primary Care - Care Coordination Referral    Cracked tooth  - Primary Care - Care Coordination Referral    Depression, unspecified depression type  ELIZABETH (generalized anxiety disorder)  Mood has been overall stable, however nausea, weight gain and trouble sleeping which she attributes to the Prozac 20 mg.  Felt like Celexa was initially helpful, then it stopped worsening.  Previously failed Lexapro and Zoloft.  With her numerous trials of antidepressants and side effects, recommend GeneSight testing.  Discussed process and referral placed.  In the interim, plan to reduce Prozac to 10 mg to minimize any side effects while we determine the next best medication.  Follow-up planned in 2 weeks.  - Adult Genetics & Metabolism Referral; Future  - FLUoxetine (PROZAC) 10 MG capsule; Take 1 capsule (10 mg) by mouth daily    Gastroesophageal reflux disease without esophagitis  Worsening since her gallbladder was removed in April.  Responsive to omeprazole, has taken some of her mother's.  No red flag symptoms.  Plan to trial 1 to 2 months of omeprazole.  Discussed that long-term it is not an ideal medication and  would recommend weaning to Pepcid as needed at that time.  If symptoms were to recur when trying to come off omeprazole or she was unable to, would need further evaluation.  - omeprazole (PRILOSEC) 20 MG DR capsule; Take 1 capsule (20 mg) by mouth daily        Mago Perez MD  [facility] - MARIAM Penny is a 18 year old, presenting for the following health issues:  Dental Pain        9/7/2023     3:58 PM   Additional Questions   Roomed by Ely Adams   Accompanied by Boyfriend         9/7/2023     3:58 PM   Patient Reported Additional Medications   Patient reports taking the following new medications None       HPI     Concern - Dental Pain  Onset: a week or so ago  Description: Dental pain on both sides of mouth (lower)  Intensity: moderate, severe  Progression of Symptoms:  worsening and constant  Accompanying Signs & Symptoms: None  Previous history of similar problem: None  Precipitating factors:        Worsened by: None  Alleviating factors:        Improved by: Orajel gives relief for a brief amount of time  Therapies tried and outcome: Orajel, Ibuprofen with little to no relief     Teeth issues for two years. Worse over last week. Swelling now on right side of gum. Pain radiating into jaw. A little painful to open mouth but still can. No fevers. No chills. No abscess. Taking tylenol and ibuprofen, last took last night.     Not sure if she likes prozac. Weight gain, trouble sleeping, nausea.  Mood is overall okay.  Did feel like the nausea got worse when she increased from 10 to 20 mg of the Prozac.  Celexa on prior - no side effects. Just felt like it wasn't working.   Failed lexapro and zoloft in past  No prior gene site testing     Acid reflux since gallbladder removed.  Mom is on omeprazole and she has tried some and it is quite helpful.  Does get the burning and reflux every day.  No emesis or hematemesis.        Objective    /75 (BP Location: Left arm, Patient  Position: Sitting, Cuff Size: Adult Regular)   Pulse 76   Temp 98.7  F (37.1  C) (Tympanic)   Wt 55.5 kg (122 lb 6 oz)   LMP 08/17/2023 (Within Days)   SpO2 98%   BMI 22.38 kg/m    Body mass index is 22.38 kg/m .    Physical Exam  Constitutional:       General: She is not in acute distress.     Appearance: Normal appearance. She is not ill-appearing.   HENT:      Head:      Jaw: Swelling (Very minimal, slight swelling over the right lower jaw area compared to the left.  No erythema or tenderness to palpation.) present. No trismus or tenderness.      Mouth/Throat:      Lips: Pink.      Mouth: Mucous membranes are moist.      Dentition: Abnormal dentition. Dental caries present.      Tongue: No lesions.      Palate: No mass.      Tonsils: No tonsillar exudate.        Comments: Right posterior tooth circled in photo, is cracked with some erythema of the gum and gumline tenderness surrounding.  No palpable abscess or significant fluctuant edema.  No periapical tenderness to palpation.  Tooth on the other side of her mouth is also cracked but no signs of inflammation surrounding.  Cardiovascular:      Rate and Rhythm: Normal rate and regular rhythm.   Pulmonary:      Effort: Pulmonary effort is normal.   Musculoskeletal:      Cervical back: Neck supple. No tenderness.   Lymphadenopathy:      Cervical: No cervical adenopathy.   Skin:     General: Skin is warm and dry.   Neurological:      General: No focal deficit present.      Mental Status: She is alert and oriented to person, place, and time.   Psychiatric:         Mood and Affect: Mood normal.         Behavior: Behavior normal.

## 2023-09-07 NOTE — PATIENT INSTRUCTIONS
While on antibiotics, it's recommended you take probiotics, eat more yogurt or drink Kefir, to promote good bacteria in your gut. Doing this for 2 weeks after finishing the antibiotics is also recommended to minimize GI side effects, like diarrhea. For probiotics, ones that have >1,000,000 colony forming units (CFUs) are good to take.

## 2023-09-08 ENCOUNTER — PATIENT OUTREACH (OUTPATIENT)
Dept: CARE COORDINATION | Facility: OTHER | Age: 18
End: 2023-09-08

## 2023-09-08 NOTE — PROGRESS NOTES
Clinic Care Coordination Contact  Care Team Conversations    SW reached out to Hemalatha following referral from PCP regarding dental concerns related to insurance. No answer. SW left  with request for call back. DASHA will reattempt at a later date.    Ana María Espino, ROLANDA  [facility]

## 2023-09-11 ENCOUNTER — PATIENT OUTREACH (OUTPATIENT)
Dept: CARE COORDINATION | Facility: OTHER | Age: 18
End: 2023-09-11

## 2023-09-11 NOTE — PROGRESS NOTES
Clinic Care Coordination Contact  Care Team Conversations    SW reached out to Hemalatha attempt #2 following referral from PCP regarding dental concerns related to insurance. No answer. SW left VM with request for call back. SW will reattempt at a later date.     KAYLEE Regalado  [facility]

## 2023-09-13 ENCOUNTER — PATIENT OUTREACH (OUTPATIENT)
Dept: CARE COORDINATION | Facility: OTHER | Age: 18
End: 2023-09-13

## 2023-09-13 NOTE — PROGRESS NOTES
Clinic Care Coordination Contact  Care Team Conversations    SW reached out to Hemalatha attempt #3 following referral from PCP regarding dental concerns related to insurance. No answer. SW left VM stating she will not be making any further outreach, provided direct number should she wish for this SW's services in the future.     No further contact from DASHA.      Ana María Espino, [facility]

## 2023-09-23 DIAGNOSIS — E61.1 IRON DEFICIENCY: ICD-10-CM

## 2023-09-25 RX ORDER — FERROUS SULFATE 325(65) MG
TABLET ORAL
Qty: 30 TABLET | Refills: 5 | Status: SHIPPED | OUTPATIENT
Start: 2023-09-25 | End: 2024-03-11

## 2023-09-25 NOTE — TELEPHONE ENCOUNTER
SV IRON 325 MG tablet   Last Written Prescription Date:  6/7/23  Last Fill Quantity: 30,   # refills: 1  Last Office Visit: 9/7/23  Future Office visit:       Routing refill request to provider for review/approval because:

## 2023-10-03 DIAGNOSIS — F32.A DEPRESSION, UNSPECIFIED DEPRESSION TYPE: ICD-10-CM

## 2023-10-03 DIAGNOSIS — F41.1 GAD (GENERALIZED ANXIETY DISORDER): ICD-10-CM

## 2023-10-05 ENCOUNTER — TELEPHONE (OUTPATIENT)
Dept: FAMILY MEDICINE | Facility: OTHER | Age: 18
End: 2023-10-05

## 2023-10-05 RX ORDER — FLUOXETINE 10 MG/1
10 CAPSULE ORAL DAILY
Qty: 30 CAPSULE | Refills: 0 | Status: SHIPPED | OUTPATIENT
Start: 2023-10-05 | End: 2023-11-06

## 2023-10-05 NOTE — TELEPHONE ENCOUNTER
FLUoxetine (PROZAC) 10 MG capsule 30 capsule 0 9/7/2023   Last Office Visit: 9/7/23     Future Office visit:       Routing refill request to provider for review/approval because:

## 2023-10-05 NOTE — TELEPHONE ENCOUNTER
Left  for patient to call and schedule appointment.  She needs a Mental Health follow up with Dr. Perez.

## 2023-10-10 NOTE — TELEPHONE ENCOUNTER
Attempt # 2  Outcome: Left Message   Comment: lvm for patient to call back to schedule mental health follow up with pcp

## 2023-11-06 DIAGNOSIS — K21.9 GASTROESOPHAGEAL REFLUX DISEASE WITHOUT ESOPHAGITIS: ICD-10-CM

## 2023-11-06 DIAGNOSIS — F41.1 GAD (GENERALIZED ANXIETY DISORDER): ICD-10-CM

## 2023-11-06 DIAGNOSIS — F32.A DEPRESSION, UNSPECIFIED DEPRESSION TYPE: ICD-10-CM

## 2023-11-06 RX ORDER — FLUOXETINE 10 MG/1
10 CAPSULE ORAL DAILY
Qty: 30 CAPSULE | Refills: 0 | Status: SHIPPED | OUTPATIENT
Start: 2023-11-06 | End: 2024-03-11

## 2023-11-06 NOTE — TELEPHONE ENCOUNTER
Fluoxetine (PROZAC) 10 mg cap       Last Written Prescription Date:  10/5/23  Last Fill Quantity: 30,   # refills: 0  Last Office Visit: 9/7/23      Omeprazole (PRILOSEC) 20 mg DR cap       Last Written Prescription Date:  9/7/23  Last Fill Quantity: 30,   # refills: 1  Last Office Visit: 9/7/23

## 2024-03-05 NOTE — PROGRESS NOTES
"  Assessment & Plan     Moderate episode of recurrent major depressive disorder (H)  ELIZABETH (generalized anxiety disorder)  Mixed obsessional thoughts and acts  Quite low \"funk\" over winter, slight improvement now but hoping for medication assistance  Discussed risks and benefits of Celexa, she was agreeable  Uncertain what issue with Lexapro was in the past, did seem to work well initially.  Also tried Zoloft and Prozac recently.  Unable to afford pharmacal genetic testing-not covered by her insurance right now  Encouraged her to reconsider therapy, can reach out if referral needed  With her trouble sleeping, will also trial hydroxyzine at night as needed  Close follow-up in 3 weeks, sooner if needed  - citalopram (CELEXA) 10 MG tablet; Take 1 tablet (10 mg) by mouth daily for 14 days, THEN 2 tablets (20 mg) daily for 14 days.  - hydrOXYzine HCl (ATARAX) 25 MG tablet; Take 1 tablet (25 mg) by mouth nightly as needed for anxiety    Vitamin D deficiency  Low in the past and has not been taking a supplement.  Recheck lab today and reorder the 2000 units once daily.  - vitamin D3 (CHOLECALCIFEROL) 50 mcg (2000 units) tablet; Take 1 tablet (50 mcg) by mouth daily  - Vitamin D Deficiency; Future  - Vitamin D Deficiency    Iron deficiency  Quite low in the past, does get menorrhagia.  Recheck iron levels today and reorder iron.  - ferrous sulfate (SV IRON) 325 (65 Fe) MG tablet; TAKE 1 TABLET BY MOUTH EVERY OTHER DAY --  DO  NOT  TAKE  WITH  PPI  OR  ANTACID  - Iron and iron binding capacity; Future  - CBC with platelets and differential; Future  - Iron and iron binding capacity  - CBC with platelets and differential    EPP (erythropoietic protoporphyria) (H)  Due for monitoring-will update CBC and CMP.  - Comprehensive metabolic panel (BMP + Alb, Alk Phos, ALT, AST, Total. Bili, TP); Future  - Comprehensive metabolic panel (BMP + Alb, Alk Phos, ALT, AST, Total. Bili, TP)        Tanvir Penny is a 18 year old, " presenting for the following health issues:  Anxiety and Depression        3/11/2024    10:52 AM   Additional Questions   Roomed by Stuart Walker   Accompanied by Self         3/11/2024    10:52 AM   Patient Reported Additional Medications   Patient reports taking the following new medications none     HPI     Depression and Anxiety Follow-Up  How are you doing with your depression since your last visit? Worsened. States she has not been on her medications   How are you doing with your anxiety since your last visit?  Worsened. States she has not been on her medications   Are you having other symptoms that might be associated with depression or anxiety? No  Have you had a significant life event? No   Do you have any concerns with your use of alcohol or other drugs? No    Anxiety has been worse over the last week.   Very overwhelmed.   Feeling in a slump, easily worked up for a few weeks   Didn't feel like prozac helped depression  Caused weight gain  Genetic testing not covered   Enjoys reading, video games with BF- slowly getting back into reading   Took a 4mo break from the gym   Trouble with falling asleep due to anxiety - only getting a few hours some nights but some days are okay   No SI/HI  Talking to  about mental health     Finally got dental insurance. Getting two teeth surgically removed then going to [facility].    Periods are still heavy. Will vary 1-2 times per month. Not on BC. Last period 2/16/24. No pregnancy.     Social History     Tobacco Use    Smoking status: Never    Smokeless tobacco: Never   Vaping Use    Vaping Use: Never used   Substance Use Topics    Alcohol use: Never     Alcohol/week: 0.0 standard drinks of alcohol    Drug use: Never         2/16/2023     8:22 AM 3/27/2023     3:43 PM 3/11/2024    10:40 AM   PHQ   PHQ-9 Total Score 14 17 15   Q9: Thoughts of better off dead/self-harm past 2 weeks Not at all Not at all Not at all         2/16/2023     8:24 AM 3/27/2023     3:44 PM 3/11/2024     10:41 AM   ELIZABETH-7 SCORE   Total Score  16 (severe anxiety) 16 (severe anxiety)   Total Score 18 16 16         3/11/2024    10:40 AM   Last PHQ-9   1.  Little interest or pleasure in doing things 3   2.  Feeling down, depressed, or hopeless 1   3.  Trouble falling or staying asleep, or sleeping too much 3   4.  Feeling tired or having little energy 3   5.  Poor appetite or overeating 3   6.  Feeling bad about yourself 1   7.  Trouble concentrating 1   8.  Moving slowly or restless 0   Q9: Thoughts of better off dead/self-harm past 2 weeks 0   PHQ-9 Total Score 15         3/11/2024    10:41 AM   ELIZABETH-7    1. Feeling nervous, anxious, or on edge 3   2. Not being able to stop or control worrying 3   3. Worrying too much about different things 3   4. Trouble relaxing 1   5. Being so restless that it is hard to sit still 1   6. Becoming easily annoyed or irritable 3   7. Feeling afraid, as if something awful might happen 2   ELIZABETH-7 Total Score 16   If you checked any problems, how difficult have they made it for you to do your work, take care of things at home, or get along with other people? Somewhat difficult           Objective    BP 92/64 (BP Location: Right arm, Patient Position: Sitting, Cuff Size: Adult Regular)   Pulse 82   Temp 98.9  F (37.2  C) (Tympanic)   Wt 60.5 kg (133 lb 6.4 oz)   LMP  (LMP Unknown)   SpO2 97%   BMI 24.40 kg/m    Body mass index is 24.4 kg/m .  Physical Exam  Constitutional:       General: She is not in acute distress.     Appearance: Normal appearance. She is not ill-appearing.   Pulmonary:      Effort: Pulmonary effort is normal.   Skin:     General: Skin is warm and dry.   Neurological:      General: No focal deficit present.      Mental Status: She is alert.   Psychiatric:         Mood and Affect: Mood normal.         Behavior: Behavior normal.         Thought Content: Thought content normal.         Judgment: Judgment normal.        Results for orders placed or performed in visit on  03/11/24   CBC with platelets and differential     Status: Abnormal   Result Value Ref Range    WBC Count 7.7 4.0 - 11.0 10e3/uL    RBC Count 5.35 (H) 3.80 - 5.20 10e6/uL    Hemoglobin 13.4 11.7 - 15.7 g/dL    Hematocrit 42.0 35.0 - 47.0 %    MCV 79 78 - 100 fL    MCH 25.0 (L) 26.5 - 33.0 pg    MCHC 31.9 31.5 - 36.5 g/dL    RDW 16.5 (H) 10.0 - 15.0 %    Platelet Count 126 (L) 150 - 450 10e3/uL    % Neutrophils 63 %    % Lymphocytes 28 %    % Monocytes 7 %    % Eosinophils 2 %    % Basophils 0 %    % Immature Granulocytes 0 %    NRBCs per 100 WBC 0 <1 /100    Absolute Neutrophils 4.9 1.6 - 8.3 10e3/uL    Absolute Lymphocytes 2.1 0.0 - 5.3 10e3/uL    Absolute Monocytes 0.5 0.0 - 1.3 10e3/uL    Absolute Eosinophils 0.1 0.0 - 0.7 10e3/uL    Absolute Basophils 0.0 0.0 - 0.2 10e3/uL    Absolute Immature Granulocytes 0.0 <=0.4 10e3/uL    Absolute NRBCs 0.0 10e3/uL   CBC with platelets and differential     Status: Abnormal (In process)    Narrative    The following orders were created for panel order CBC with platelets and differential.  Procedure                               Abnormality         Status                     ---------                               -----------         ------                     CBC with platelets and d...[[id number]]  Abnormal            Final result               RBC and Platelet Morphology[[id number]]                      In process                   Please view results for these tests on the individual orders.                 Signed Electronically by: Mago Perez MD    Answers submitted by the patient for this visit:  Patient Health Questionnaire (Submitted on 3/11/2024)  If you checked off any problems, how difficult have these problems made it for you to do your work, take care of things at home, or get along with other people?: Not difficult at all  PHQ9 TOTAL SCORE: 15  ELIZABETH-7 (Submitted on 3/11/2024)  ELIZABETH 7 TOTAL SCORE: 16

## 2024-03-11 ENCOUNTER — MYC MEDICAL ADVICE (OUTPATIENT)
Dept: FAMILY MEDICINE | Facility: OTHER | Age: 19
End: 2024-03-11

## 2024-03-11 ENCOUNTER — OFFICE VISIT (OUTPATIENT)
Dept: FAMILY MEDICINE | Facility: OTHER | Age: 19
End: 2024-03-11
Attending: STUDENT IN AN ORGANIZED HEALTH CARE EDUCATION/TRAINING PROGRAM
Payer: COMMERCIAL

## 2024-03-11 VITALS
SYSTOLIC BLOOD PRESSURE: 92 MMHG | BODY MASS INDEX: 24.4 KG/M2 | TEMPERATURE: 98.9 F | DIASTOLIC BLOOD PRESSURE: 64 MMHG | OXYGEN SATURATION: 97 % | HEART RATE: 82 BPM | WEIGHT: 133.4 LBS

## 2024-03-11 DIAGNOSIS — F41.1 GAD (GENERALIZED ANXIETY DISORDER): Chronic | ICD-10-CM

## 2024-03-11 DIAGNOSIS — F33.1 MODERATE EPISODE OF RECURRENT MAJOR DEPRESSIVE DISORDER (H): Primary | ICD-10-CM

## 2024-03-11 DIAGNOSIS — F42.2 MIXED OBSESSIONAL THOUGHTS AND ACTS: Chronic | ICD-10-CM

## 2024-03-11 DIAGNOSIS — E61.1 IRON DEFICIENCY: ICD-10-CM

## 2024-03-11 DIAGNOSIS — E55.9 VITAMIN D DEFICIENCY: Chronic | ICD-10-CM

## 2024-03-11 DIAGNOSIS — E80.0 EPP (ERYTHROPOIETIC PROTOPORPHYRIA) (H): Chronic | ICD-10-CM

## 2024-03-11 PROBLEM — K81.0 ACUTE CHOLECYSTITIS: Status: RESOLVED | Noted: 2023-04-20 | Resolved: 2024-03-11

## 2024-03-11 LAB
ACANTHOCYTES BLD QL SMEAR: NORMAL
ALBUMIN SERPL BCG-MCNC: 4.5 G/DL (ref 3.5–5.2)
ALP SERPL-CCNC: 62 U/L (ref 40–150)
ALT SERPL W P-5'-P-CCNC: 65 U/L (ref 0–50)
ANION GAP SERPL CALCULATED.3IONS-SCNC: 11 MMOL/L (ref 7–15)
AST SERPL W P-5'-P-CCNC: 35 U/L (ref 0–45)
AUER BODIES BLD QL SMEAR: NORMAL
BASO STIPL BLD QL SMEAR: NORMAL
BASOPHILS # BLD AUTO: 0 10E3/UL (ref 0–0.2)
BASOPHILS NFR BLD AUTO: 0 %
BILIRUB SERPL-MCNC: 0.5 MG/DL
BITE CELLS BLD QL SMEAR: NORMAL
BLISTER CELLS BLD QL SMEAR: NORMAL
BUN SERPL-MCNC: 13 MG/DL (ref 6–20)
BURR CELLS BLD QL SMEAR: NORMAL
CALCIUM SERPL-MCNC: 9.6 MG/DL (ref 8.6–10)
CHLORIDE SERPL-SCNC: 102 MMOL/L (ref 98–107)
CREAT SERPL-MCNC: 0.74 MG/DL (ref 0.51–0.95)
DACRYOCYTES BLD QL SMEAR: NORMAL
DEPRECATED HCO3 PLAS-SCNC: 25 MMOL/L (ref 22–29)
EGFRCR SERPLBLD CKD-EPI 2021: >90 ML/MIN/1.73M2
ELLIPTOCYTES BLD QL SMEAR: NORMAL
EOSINOPHIL # BLD AUTO: 0.1 10E3/UL (ref 0–0.7)
EOSINOPHIL NFR BLD AUTO: 2 %
ERYTHROCYTE [DISTWIDTH] IN BLOOD BY AUTOMATED COUNT: 16.5 % (ref 10–15)
FRAGMENTS BLD QL SMEAR: NORMAL
GIANT PLATELETS BLD QL SMEAR: NORMAL
GLUCOSE SERPL-MCNC: 101 MG/DL (ref 70–99)
HCT VFR BLD AUTO: 42 % (ref 35–47)
HGB BLD-MCNC: 13.4 G/DL (ref 11.7–15.7)
HGB C CRYSTALS: NORMAL
HOWELL-JOLLY BOD BLD QL SMEAR: NORMAL
IMM GRANULOCYTES # BLD: 0 10E3/UL
IMM GRANULOCYTES NFR BLD: 0 %
IRON BINDING CAPACITY (ROCHE): 337 UG/DL (ref 240–430)
IRON SATN MFR SERPL: 20 % (ref 15–46)
IRON SERPL-MCNC: 66 UG/DL (ref 37–145)
LYMPHOCYTES # BLD AUTO: 2.1 10E3/UL (ref 0–5.3)
LYMPHOCYTES NFR BLD AUTO: 28 %
MCH RBC QN AUTO: 25 PG (ref 26.5–33)
MCHC RBC AUTO-ENTMCNC: 31.9 G/DL (ref 31.5–36.5)
MCV RBC AUTO: 79 FL (ref 78–100)
MONOCYTES # BLD AUTO: 0.5 10E3/UL (ref 0–1.3)
MONOCYTES NFR BLD AUTO: 7 %
NEUTROPHILS # BLD AUTO: 4.9 10E3/UL (ref 1.6–8.3)
NEUTROPHILS NFR BLD AUTO: 63 %
NEUTS HYPERSEG BLD QL SMEAR: NORMAL
NRBC # BLD AUTO: 0 10E3/UL
NRBC BLD AUTO-RTO: 0 /100
PATH REV: NORMAL
PLAT MORPH BLD: NORMAL
PLATELET # BLD AUTO: 126 10E3/UL (ref 150–450)
POLYCHROMASIA BLD QL SMEAR: NORMAL
POTASSIUM SERPL-SCNC: 4.4 MMOL/L (ref 3.4–5.3)
PROT SERPL-MCNC: 7.5 G/DL (ref 6.4–8.3)
RBC # BLD AUTO: 5.35 10E6/UL (ref 3.8–5.2)
RBC AGGLUT BLD QL: NORMAL
RBC MORPH BLD: NORMAL
ROULEAUX BLD QL SMEAR: NORMAL
SICKLE CELLS BLD QL SMEAR: NORMAL
SMUDGE CELLS BLD QL SMEAR: NORMAL
SODIUM SERPL-SCNC: 138 MMOL/L (ref 135–145)
SPHEROCYTES BLD QL SMEAR: NORMAL
STOMATOCYTES BLD QL SMEAR: NORMAL
TARGETS BLD QL SMEAR: NORMAL
TOXIC GRANULES BLD QL SMEAR: NORMAL
VARIANT LYMPHS BLD QL SMEAR: NORMAL
WBC # BLD AUTO: 7.7 10E3/UL (ref 4–11)

## 2024-03-11 PROCEDURE — 36415 COLL VENOUS BLD VENIPUNCTURE: CPT | Performed by: STUDENT IN AN ORGANIZED HEALTH CARE EDUCATION/TRAINING PROGRAM

## 2024-03-11 PROCEDURE — 83550 IRON BINDING TEST: CPT | Performed by: STUDENT IN AN ORGANIZED HEALTH CARE EDUCATION/TRAINING PROGRAM

## 2024-03-11 PROCEDURE — 99214 OFFICE O/P EST MOD 30 MIN: CPT | Performed by: STUDENT IN AN ORGANIZED HEALTH CARE EDUCATION/TRAINING PROGRAM

## 2024-03-11 PROCEDURE — 82306 VITAMIN D 25 HYDROXY: CPT | Performed by: STUDENT IN AN ORGANIZED HEALTH CARE EDUCATION/TRAINING PROGRAM

## 2024-03-11 PROCEDURE — 83540 ASSAY OF IRON: CPT | Performed by: STUDENT IN AN ORGANIZED HEALTH CARE EDUCATION/TRAINING PROGRAM

## 2024-03-11 PROCEDURE — 80053 COMPREHEN METABOLIC PANEL: CPT | Performed by: STUDENT IN AN ORGANIZED HEALTH CARE EDUCATION/TRAINING PROGRAM

## 2024-03-11 PROCEDURE — 85025 COMPLETE CBC W/AUTO DIFF WBC: CPT | Performed by: STUDENT IN AN ORGANIZED HEALTH CARE EDUCATION/TRAINING PROGRAM

## 2024-03-11 RX ORDER — CHOLECALCIFEROL (VITAMIN D3) 50 MCG
1 TABLET ORAL DAILY
Qty: 90 TABLET | Refills: 1 | Status: SHIPPED | OUTPATIENT
Start: 2024-03-11

## 2024-03-11 RX ORDER — CITALOPRAM HYDROBROMIDE 10 MG/1
TABLET ORAL
Qty: 42 TABLET | Refills: 0 | Status: SHIPPED | OUTPATIENT
Start: 2024-03-11 | End: 2024-04-04

## 2024-03-11 RX ORDER — HYDROXYZINE HYDROCHLORIDE 25 MG/1
25 TABLET, FILM COATED ORAL
Qty: 30 TABLET | Refills: 1 | Status: SHIPPED | OUTPATIENT
Start: 2024-03-11 | End: 2024-04-18

## 2024-03-11 RX ORDER — FERROUS SULFATE 325(65) MG
TABLET ORAL
Qty: 90 TABLET | Refills: 3 | Status: SHIPPED | OUTPATIENT
Start: 2024-03-11

## 2024-03-11 ASSESSMENT — PAIN SCALES - GENERAL: PAINLEVEL: NO PAIN (0)

## 2024-03-11 ASSESSMENT — ANXIETY QUESTIONNAIRES
8. IF YOU CHECKED OFF ANY PROBLEMS, HOW DIFFICULT HAVE THESE MADE IT FOR YOU TO DO YOUR WORK, TAKE CARE OF THINGS AT HOME, OR GET ALONG WITH OTHER PEOPLE?: SOMEWHAT DIFFICULT
GAD7 TOTAL SCORE: 16
7. FEELING AFRAID AS IF SOMETHING AWFUL MIGHT HAPPEN: MORE THAN HALF THE DAYS
5. BEING SO RESTLESS THAT IT IS HARD TO SIT STILL: SEVERAL DAYS
GAD7 TOTAL SCORE: 16
7. FEELING AFRAID AS IF SOMETHING AWFUL MIGHT HAPPEN: MORE THAN HALF THE DAYS
IF YOU CHECKED OFF ANY PROBLEMS ON THIS QUESTIONNAIRE, HOW DIFFICULT HAVE THESE PROBLEMS MADE IT FOR YOU TO DO YOUR WORK, TAKE CARE OF THINGS AT HOME, OR GET ALONG WITH OTHER PEOPLE: SOMEWHAT DIFFICULT
6. BECOMING EASILY ANNOYED OR IRRITABLE: NEARLY EVERY DAY
GAD7 TOTAL SCORE: 16
1. FEELING NERVOUS, ANXIOUS, OR ON EDGE: NEARLY EVERY DAY
3. WORRYING TOO MUCH ABOUT DIFFERENT THINGS: NEARLY EVERY DAY
4. TROUBLE RELAXING: SEVERAL DAYS
2. NOT BEING ABLE TO STOP OR CONTROL WORRYING: NEARLY EVERY DAY

## 2024-03-11 ASSESSMENT — PATIENT HEALTH QUESTIONNAIRE - PHQ9
10. IF YOU CHECKED OFF ANY PROBLEMS, HOW DIFFICULT HAVE THESE PROBLEMS MADE IT FOR YOU TO DO YOUR WORK, TAKE CARE OF THINGS AT HOME, OR GET ALONG WITH OTHER PEOPLE: NOT DIFFICULT AT ALL
SUM OF ALL RESPONSES TO PHQ QUESTIONS 1-9: 15
SUM OF ALL RESPONSES TO PHQ QUESTIONS 1-9: 15

## 2024-03-11 NOTE — PATIENT INSTRUCTIONS
Start celexa 10mg once daily for two weeks. If minimal benefit at 2 weeks, increase to 20mg daily. If going well, can hold at 10mg dose until follow up.       Psychologists/ Counselors                        [facility]                    [phone]  [facility]                   [phone]  [facility]           ...      .. [phone]  [facility]                ...[phone]  [facility]     ...          ...[phone]  [facility]               [phone]   [facility]..            .[phone]  [facility]               .    [phone]  [facility]                ..[phone]  [facility]     .      . ..[phone]

## 2024-03-12 LAB — VIT D+METAB SERPL-MCNC: 24 NG/ML (ref 20–50)

## 2024-04-04 ENCOUNTER — OFFICE VISIT (OUTPATIENT)
Dept: FAMILY MEDICINE | Facility: OTHER | Age: 19
End: 2024-04-04
Attending: STUDENT IN AN ORGANIZED HEALTH CARE EDUCATION/TRAINING PROGRAM
Payer: COMMERCIAL

## 2024-04-04 VITALS
WEIGHT: 129.06 LBS | HEART RATE: 100 BPM | DIASTOLIC BLOOD PRESSURE: 77 MMHG | SYSTOLIC BLOOD PRESSURE: 109 MMHG | RESPIRATION RATE: 18 BRPM | OXYGEN SATURATION: 96 % | BODY MASS INDEX: 23.75 KG/M2 | TEMPERATURE: 99.3 F | HEIGHT: 62 IN

## 2024-04-04 DIAGNOSIS — E61.1 IRON DEFICIENCY: ICD-10-CM

## 2024-04-04 DIAGNOSIS — R73.01 ELEVATED FASTING GLUCOSE: ICD-10-CM

## 2024-04-04 DIAGNOSIS — F33.1 MODERATE EPISODE OF RECURRENT MAJOR DEPRESSIVE DISORDER (H): ICD-10-CM

## 2024-04-04 DIAGNOSIS — F41.1 GAD (GENERALIZED ANXIETY DISORDER): Primary | Chronic | ICD-10-CM

## 2024-04-04 PROCEDURE — 99214 OFFICE O/P EST MOD 30 MIN: CPT | Performed by: STUDENT IN AN ORGANIZED HEALTH CARE EDUCATION/TRAINING PROGRAM

## 2024-04-04 RX ORDER — CITALOPRAM HYDROBROMIDE 10 MG/1
10 TABLET ORAL DAILY
Qty: 30 TABLET | Refills: 0 | Status: SHIPPED | OUTPATIENT
Start: 2024-04-04 | End: 2024-04-18 | Stop reason: ALTCHOICE

## 2024-04-04 RX ORDER — CITALOPRAM HYDROBROMIDE 20 MG/1
20 TABLET ORAL DAILY
Qty: 30 TABLET | Refills: 0 | Status: SHIPPED | OUTPATIENT
Start: 2024-04-04 | End: 2024-04-18

## 2024-04-04 ASSESSMENT — ANXIETY QUESTIONNAIRES
IF YOU CHECKED OFF ANY PROBLEMS ON THIS QUESTIONNAIRE, HOW DIFFICULT HAVE THESE PROBLEMS MADE IT FOR YOU TO DO YOUR WORK, TAKE CARE OF THINGS AT HOME, OR GET ALONG WITH OTHER PEOPLE: VERY DIFFICULT
8. IF YOU CHECKED OFF ANY PROBLEMS, HOW DIFFICULT HAVE THESE MADE IT FOR YOU TO DO YOUR WORK, TAKE CARE OF THINGS AT HOME, OR GET ALONG WITH OTHER PEOPLE?: VERY DIFFICULT
7. FEELING AFRAID AS IF SOMETHING AWFUL MIGHT HAPPEN: NEARLY EVERY DAY
1. FEELING NERVOUS, ANXIOUS, OR ON EDGE: NEARLY EVERY DAY
2. NOT BEING ABLE TO STOP OR CONTROL WORRYING: NEARLY EVERY DAY
5. BEING SO RESTLESS THAT IT IS HARD TO SIT STILL: SEVERAL DAYS
7. FEELING AFRAID AS IF SOMETHING AWFUL MIGHT HAPPEN: NEARLY EVERY DAY
GAD7 TOTAL SCORE: 19
6. BECOMING EASILY ANNOYED OR IRRITABLE: NEARLY EVERY DAY
GAD7 TOTAL SCORE: 19
3. WORRYING TOO MUCH ABOUT DIFFERENT THINGS: NEARLY EVERY DAY
GAD7 TOTAL SCORE: 19
4. TROUBLE RELAXING: NEARLY EVERY DAY

## 2024-04-04 ASSESSMENT — PATIENT HEALTH QUESTIONNAIRE - PHQ9
SUM OF ALL RESPONSES TO PHQ QUESTIONS 1-9: 16
10. IF YOU CHECKED OFF ANY PROBLEMS, HOW DIFFICULT HAVE THESE PROBLEMS MADE IT FOR YOU TO DO YOUR WORK, TAKE CARE OF THINGS AT HOME, OR GET ALONG WITH OTHER PEOPLE: VERY DIFFICULT
SUM OF ALL RESPONSES TO PHQ QUESTIONS 1-9: 16

## 2024-04-04 ASSESSMENT — PAIN SCALES - GENERAL: PAINLEVEL: NO PAIN (0)

## 2024-04-04 NOTE — PROGRESS NOTES
Assessment & Plan     ELIZABETH (generalized anxiety disorder)  Moderate episode of recurrent major depressive disorder (H)  Slight benefit, although still nowhere near adequate.  Depression seems to be feeding into her anxiety, however she does have generalized anxiety and is very fearful of driving/taking a 's test which has limited her goals, such as obtaining a job.  Definitely important to continue with therapy, did have 2 sessions with a new therapist.  Plan to increase Celexa to 30 mg once daily.  If continued improvement, consider increase to 40 mg at follow-up.  If no change or worsening, could consider transition to SNRI, as she has never tried any medications in that category but has failed near all SSRIs.  Could also consider addition of buspar for anxiety.  Close follow up two weeks, sooner if needed.   - citalopram (CELEXA) 20 MG tablet; Take 1 tablet (20 mg) by mouth daily Take with 10mg tab for TDD of 30mg  - citalopram (CELEXA) 10 MG tablet; Take 1 tablet (10 mg) by mouth daily Take with 20mg tab for TDD of 30mg    Iron deficiency  As prescription was not covered, recommend obtaining iron supplement over the counter at [facility].    Elevated fasting glucose  Will check A1c.  Very minimally elevated at 101.  - Hemoglobin A1c; Future        Subjective   Hemalatha is a 18 year old, presenting for the following health issues:  Anxiety and Depression        4/4/2024    11:31 AM   Additional Questions   Roomed by Ely Adams   Accompanied by None         4/4/2024    11:31 AM   Patient Reported Additional Medications   Patient reports taking the following new medications None     HPI     Depression and Anxiety   How are you doing with your depression since your last visit? No change  How are you doing with your anxiety since your last visit?  Worsened   Are you having other symptoms that might be associated with depression or anxiety? No  Have you had a significant life event? No   Do you have any  concerns with your use of alcohol or other drugs? No    Last seen 3/11/24  Still very overwhelmed.   1.5 weeks on 20mg celexa  Using hydroxyzine before sleep - 50/50 effective   May be doing more/going out and about with less panic symptoms. More bearable if she does need to do things.   Feels depression is the worst. Can't cheer up, not feeling happy. Less enjoyment.   Her and her boyfriend are living with her dad and he is ready to move out but she does not feel like she is ready for that.  Relationship has really been weighing on her.  Doesn't have a license - very scared to drive. Doesn't want to get hurt or hurt anyone. Would need license for a job.   Feels self sabotaging in a way  Still no gym. Waiting on [employer] [employer] new job to determine where to go for the gym  Seeing therapist at [facility] - has seen twice  No HI or SI     Uncertain what issue with Lexapro was in the past, did seem to work well initially. Also tried Zoloft and Prozac recently.     Social History     Tobacco Use    Smoking status: Never    Smokeless tobacco: Never   Vaping Use    Vaping Use: Never used   Substance Use Topics    Alcohol use: Never     Alcohol/week: 0.0 standard drinks of alcohol    Drug use: Never         3/27/2023     3:43 PM 3/11/2024    10:40 AM 4/4/2024    11:30 AM   PHQ   PHQ-9 Total Score 17 15 16   Q9: Thoughts of better off dead/self-harm past 2 weeks Not at all Not at all Not at all         3/27/2023     3:44 PM 3/11/2024    10:41 AM 4/4/2024    11:31 AM   ELIZABETH-7 SCORE   Total Score 16 (severe anxiety) 16 (severe anxiety) 19 (severe anxiety)   Total Score 16 16 19         4/4/2024    11:30 AM   Last PHQ-9   1.  Little interest or pleasure in doing things 0   2.  Feeling down, depressed, or hopeless 3   3.  Trouble falling or staying asleep, or sleeping too much 3   4.  Feeling tired or having little energy 3   5.  Poor appetite or overeating 3   6.  Feeling bad about yourself 1   7.  Trouble concentrating 3   8.   "Moving slowly or restless 0   Q9: Thoughts of better off dead/self-harm past 2 weeks 0   PHQ-9 Total Score 16         4/4/2024    11:31 AM   ELIZABETH-7    1. Feeling nervous, anxious, or on edge 3   2. Not being able to stop or control worrying 3   3. Worrying too much about different things 3   4. Trouble relaxing 3   5. Being so restless that it is hard to sit still 1   6. Becoming easily annoyed or irritable 3   7. Feeling afraid, as if something awful might happen 3   ELIZABETH-7 Total Score 19   If you checked any problems, how difficult have they made it for you to do your work, take care of things at home, or get along with other people? Very difficult         Objective    /77 (BP Location: Left arm, Patient Position: Sitting, Cuff Size: Adult Regular)   Pulse 100   Temp 99.3  F (37.4  C) (Tympanic)   Resp 18   Ht 1.575 m (5' 2\")   Wt 58.5 kg (129 lb 1 oz)   LMP  (LMP Unknown)   SpO2 96%   BMI 23.61 kg/m    Body mass index is 23.61 kg/m .  Physical Exam  Constitutional:       General: She is not in acute distress.     Appearance: Normal appearance. She is not ill-appearing.   Pulmonary:      Effort: Pulmonary effort is normal.   Skin:     General: Skin is warm and dry.   Neurological:      General: No focal deficit present.      Mental Status: She is alert.   Psychiatric:         Mood and Affect: Mood normal. Affect is tearful.         Behavior: Behavior normal.         Thought Content: Thought content normal.         Judgment: Judgment normal.                  Signed Electronically by: Mago Perez MD    Answers submitted by the patient for this visit:  Patient Health Questionnaire (Submitted on 4/4/2024)  If you checked off any problems, how difficult have these problems made it for you to do your work, take care of things at home, or get along with other people?: Very difficult  PHQ9 TOTAL SCORE: 16  ELIZABETH-7 (Submitted on 4/4/2024)  ELIZABETH 7 TOTAL SCORE: 19    "

## 2024-04-04 NOTE — PATIENT INSTRUCTIONS
[facility] [phone] - they can certify for medical marijuana    [facility]

## 2024-04-18 ENCOUNTER — OFFICE VISIT (OUTPATIENT)
Dept: FAMILY MEDICINE | Facility: OTHER | Age: 19
End: 2024-04-18
Attending: STUDENT IN AN ORGANIZED HEALTH CARE EDUCATION/TRAINING PROGRAM
Payer: COMMERCIAL

## 2024-04-18 VITALS
DIASTOLIC BLOOD PRESSURE: 72 MMHG | SYSTOLIC BLOOD PRESSURE: 105 MMHG | WEIGHT: 123.31 LBS | HEART RATE: 86 BPM | OXYGEN SATURATION: 97 % | TEMPERATURE: 98.2 F | BODY MASS INDEX: 22.69 KG/M2 | RESPIRATION RATE: 16 BRPM | HEIGHT: 62 IN

## 2024-04-18 DIAGNOSIS — F41.1 GAD (GENERALIZED ANXIETY DISORDER): Primary | Chronic | ICD-10-CM

## 2024-04-18 DIAGNOSIS — R73.01 ELEVATED FASTING GLUCOSE: ICD-10-CM

## 2024-04-18 DIAGNOSIS — F42.2 MIXED OBSESSIONAL THOUGHTS AND ACTS: Chronic | ICD-10-CM

## 2024-04-18 DIAGNOSIS — F33.1 MODERATE EPISODE OF RECURRENT MAJOR DEPRESSIVE DISORDER (H): ICD-10-CM

## 2024-04-18 DIAGNOSIS — F32.A DEPRESSION, UNSPECIFIED DEPRESSION TYPE: Chronic | ICD-10-CM

## 2024-04-18 LAB
EST. AVERAGE GLUCOSE BLD GHB EST-MCNC: 100 MG/DL
HBA1C MFR BLD: 5.1 %

## 2024-04-18 PROCEDURE — 83036 HEMOGLOBIN GLYCOSYLATED A1C: CPT | Performed by: STUDENT IN AN ORGANIZED HEALTH CARE EDUCATION/TRAINING PROGRAM

## 2024-04-18 PROCEDURE — 99214 OFFICE O/P EST MOD 30 MIN: CPT | Performed by: STUDENT IN AN ORGANIZED HEALTH CARE EDUCATION/TRAINING PROGRAM

## 2024-04-18 PROCEDURE — 36415 COLL VENOUS BLD VENIPUNCTURE: CPT | Performed by: STUDENT IN AN ORGANIZED HEALTH CARE EDUCATION/TRAINING PROGRAM

## 2024-04-18 RX ORDER — CITALOPRAM HYDROBROMIDE 40 MG/1
40 TABLET ORAL DAILY
Qty: 30 TABLET | Refills: 0 | Status: SHIPPED | OUTPATIENT
Start: 2024-04-18 | End: 2024-05-29

## 2024-04-18 RX ORDER — HYDROXYZINE HYDROCHLORIDE 25 MG/1
25 TABLET, FILM COATED ORAL
Qty: 30 TABLET | Refills: 1 | Status: SHIPPED | OUTPATIENT
Start: 2024-04-18 | End: 2024-06-18

## 2024-04-18 ASSESSMENT — ANXIETY QUESTIONNAIRES
GAD7 TOTAL SCORE: 20
8. IF YOU CHECKED OFF ANY PROBLEMS, HOW DIFFICULT HAVE THESE MADE IT FOR YOU TO DO YOUR WORK, TAKE CARE OF THINGS AT HOME, OR GET ALONG WITH OTHER PEOPLE?: VERY DIFFICULT
IF YOU CHECKED OFF ANY PROBLEMS ON THIS QUESTIONNAIRE, HOW DIFFICULT HAVE THESE PROBLEMS MADE IT FOR YOU TO DO YOUR WORK, TAKE CARE OF THINGS AT HOME, OR GET ALONG WITH OTHER PEOPLE: VERY DIFFICULT
6. BECOMING EASILY ANNOYED OR IRRITABLE: MORE THAN HALF THE DAYS
7. FEELING AFRAID AS IF SOMETHING AWFUL MIGHT HAPPEN: NEARLY EVERY DAY
3. WORRYING TOO MUCH ABOUT DIFFERENT THINGS: NEARLY EVERY DAY
GAD7 TOTAL SCORE: 20
1. FEELING NERVOUS, ANXIOUS, OR ON EDGE: NEARLY EVERY DAY
GAD7 TOTAL SCORE: 20
7. FEELING AFRAID AS IF SOMETHING AWFUL MIGHT HAPPEN: NEARLY EVERY DAY
2. NOT BEING ABLE TO STOP OR CONTROL WORRYING: NEARLY EVERY DAY
5. BEING SO RESTLESS THAT IT IS HARD TO SIT STILL: NEARLY EVERY DAY
4. TROUBLE RELAXING: NEARLY EVERY DAY

## 2024-04-18 ASSESSMENT — PATIENT HEALTH QUESTIONNAIRE - PHQ9
SUM OF ALL RESPONSES TO PHQ QUESTIONS 1-9: 17
SUM OF ALL RESPONSES TO PHQ QUESTIONS 1-9: 17
10. IF YOU CHECKED OFF ANY PROBLEMS, HOW DIFFICULT HAVE THESE PROBLEMS MADE IT FOR YOU TO DO YOUR WORK, TAKE CARE OF THINGS AT HOME, OR GET ALONG WITH OTHER PEOPLE: EXTREMELY DIFFICULT

## 2024-04-18 ASSESSMENT — PAIN SCALES - GENERAL: PAINLEVEL: NO PAIN (0)

## 2024-04-18 NOTE — PROGRESS NOTES
Assessment & Plan     ELIZABETH (generalized anxiety disorder)  Persistent pervasive anxiety.  Suspect we will need to add something such as BuSpar for anxiety control, however will increase Celexa first to 40 mg.  Her dad has done very well on Celexa and since we did have initial benefit, I am hopeful that we will get further improvement on a higher dose.  I do not appreciate any overt signs of vanesa.  Lower risk on bipolar screening today.  She will continue to closely monitor.  Do feel she would strongly benefit from partial hospitalization program and she is agreeable to this so referral placed today.  Close follow-up in 2 weeks.  - hydrOXYzine HCl (ATARAX) 25 MG tablet; Take 1 tablet (25 mg) by mouth nightly as needed for anxiety  - [facility] Referral; Future  - citalopram (CELEXA) 40 MG tablet; Take 1 tablet (40 mg) by mouth daily    Mixed obsessional thoughts and acts  Reports OCD diagnosis on recent diagnostic assessment with therapist.  Will plan to titrate Celexa further to see if improvement.  - [facility] Referral; Future    Moderate episode of recurrent major depressive disorder (H)  Pervasive symptoms.  She does have a slightly brighter affect although does not endorse any significant improvement with dose change from 20 to 30 mg.  Will plan to titrate up to 40 mg Celexa and closely monitor symptoms.  If no change or benefit at follow-up, could consider change to SNRI such as Effexor.  Also contemplating a mood stabilizer such as Lamictal.  Discussed this today.  Referral placed to partial hospitalization program.  Close follow-up in 2 weeks.  - citalopram (CELEXA) 40 MG tablet; Take 1 tablet (40 mg) by mouth daily    Elevated fasting glucose  Normal A1c  - Hemoglobin A1c        Tanvir Penny is a 18 year old, presenting for the following health issues:  Anxiety and Depression        4/18/2024     2:15 PM   Additional Questions   Roomed by Ely Adams    Accompanied by None         4/18/2024     2:15 PM   Patient Reported Additional Medications   Patient reports taking the following new medications None     History of Present Illness       Reason for visit:  Medication    She eats 0-1 servings of fruits and vegetables daily.She consumes 1 sweetened beverage(s) daily.She exercises with enough effort to increase her heart rate 30 to 60 minutes per day.  She exercises with enough effort to increase her heart rate 5 days per week.   She is taking medications regularly.       Depression and Anxiety   How are you doing with your depression since your last visit? No change  How are you doing with your anxiety since your last visit?  Worsened   Are you having other symptoms that might be associated with depression or anxiety? No  Have you had a significant life event? No   Do you have any concerns with your use of alcohol or other drugs? No    Saw 2 weeks ago. Was very overwhelmed. Still ongoing.   Did increase Celexa to 30mg with no benefit.   Feeling anxiety is worse.   Panic symptoms all day long.   Feeling like initial benefit is gone.   Yesterday was a better day. Seems up and down.   Not much enjoyment day to day.   Doing therapy.   Hydroxyzine occasionally at night   Feels like mood is up and down a lot   Seeing therapist at [facility] - last saw Monday  Noted possible vanesa per patient   Diagnosed ocd on recent DA  Still no HI or SI    Uncertain what issue with Lexapro was in the past, did seem to work well initially. Also tried Zoloft and Prozac recently.     Dad is on celexa - bad anxiety and depression     Pharmacogenetic testing not covered        Bipolar Screen  1) euphoria stem.  Some people have periods lasting several days when they feel much more excited and full of energy than usual.  Their minds go for fast.  They talk a lot.  They are very restless or unable to sit still and they sometimes do things that are unusual for them, such as driving too  fast or spending too much money.  Have you ever had a period like this lasting several days or longer? Yes    Irritability stem.  Lasting several days or longer when most of the time you were so irritable or grouchy that you either started arguments, shouted at people, or hit people? Yes    MUST HAVE ONE OF TWO POSITIVE TO CONTINUE.   2) Criteria B Screening.  People have episodes like this often and changes in their thinking and behavior at the same time, like being more talkative, needing very little sleep, being very restless, going on buying sprees, and behaving in ways they would normally think it inappropriate.  Did you ever have any of these changes during her episodes of being excited and full of energy or very irritable and grouchy? YES    MUST BE POSITIVE TO CONTINUE.     3) 9 criteria B symptoms.  -Were you so irritable that you either started arguments, shouted at people, or hit people? yes  -Did you become so restless or fidgety that you paced up and down or could not stand still?yes  -Did you do anything else that was not usual for you, like talking about things you would normally keep private, or acting in ways that you will usually find embarrassing? no  -Did you try to do things that were impossible to do, like taking on large amounts of work? no  -Did you constantly keep changing her plans or activities? yes  -Did you find it hard to keep your mind on what you are doing? yes  -Did your thoughts seem to jump from one thing to another or race through your head so fast you can keep track of them? yes  -Did you sleep far less than usual and still not get tired or sleepy? no  -Did you spend so much more money than usual that it caused you to have financial trouble? no    9 questions positive = very high risk (80% or more)  7-8 questions positive equals high risk (50 to 79%)  6 questions positive equals moderate risk (25 to 49%)  5 questions positive equals low risk (5 to 24%)  0-4 questions is very low  "risk (less than 5%)      Social History     Tobacco Use    Smoking status: Never    Smokeless tobacco: Never   Vaping Use    Vaping status: Never Used   Substance Use Topics    Alcohol use: Never     Alcohol/week: 0.0 standard drinks of alcohol    Drug use: Never         3/11/2024    10:40 AM 4/4/2024    11:30 AM 4/18/2024     2:11 PM   PHQ   PHQ-9 Total Score 15 16 17   Q9: Thoughts of better off dead/self-harm past 2 weeks Not at all Not at all Not at all         3/11/2024    10:41 AM 4/4/2024    11:31 AM 4/18/2024     2:11 PM   ELIZABETH-7 SCORE   Total Score 16 (severe anxiety) 19 (severe anxiety) 20 (severe anxiety)   Total Score 16 19 20         4/18/2024     2:11 PM   Last PHQ-9   1.  Little interest or pleasure in doing things 3   2.  Feeling down, depressed, or hopeless 3   3.  Trouble falling or staying asleep, or sleeping too much 3   4.  Feeling tired or having little energy 3   5.  Poor appetite or overeating 1   6.  Feeling bad about yourself 2   7.  Trouble concentrating 1   8.  Moving slowly or restless 1   Q9: Thoughts of better off dead/self-harm past 2 weeks 0   PHQ-9 Total Score 17         4/18/2024     2:11 PM   ELIZABETH-7    1. Feeling nervous, anxious, or on edge 3   2. Not being able to stop or control worrying 3   3. Worrying too much about different things 3   4. Trouble relaxing 3   5. Being so restless that it is hard to sit still 3   6. Becoming easily annoyed or irritable 2   7. Feeling afraid, as if something awful might happen 3   ELIZABETH-7 Total Score 20   If you checked any problems, how difficult have they made it for you to do your work, take care of things at home, or get along with other people? Very difficult           Objective    /72 (BP Location: Right arm, Patient Position: Sitting, Cuff Size: Adult Regular)   Pulse 86   Temp 98.2  F (36.8  C) (Tympanic)   Resp 16   Ht 1.575 m (5' 2\")   Wt 55.9 kg (123 lb 5 oz)   LMP  (LMP Unknown)   SpO2 97%   BMI 22.55 kg/m    Body mass " index is 22.55 kg/m .  Physical Exam  Constitutional:       General: She is not in acute distress.     Appearance: Normal appearance. She is not ill-appearing.   Pulmonary:      Effort: Pulmonary effort is normal.   Skin:     General: Skin is warm and dry.   Neurological:      General: No focal deficit present.      Mental Status: She is alert.   Psychiatric:         Mood and Affect: Mood normal.         Behavior: Behavior normal.         Thought Content: Thought content normal.         Judgment: Judgment normal.      Comments: Less tearful today        Results for orders placed or performed in visit on 04/18/24   Hemoglobin A1c     Status: None   Result Value Ref Range    Estimated Average Glucose 100 mg/dL    Hemoglobin A1C 5.1 <5.7 %                 Signed Electronically by: Mago Perez MD

## 2024-04-18 NOTE — PATIENT INSTRUCTIONS
Increase celexa to 40mg   We will call about program for mental health - let me know Tuesday morning if you haven't heard  Follow up with me in two weeks

## 2024-05-28 DIAGNOSIS — F33.1 MODERATE EPISODE OF RECURRENT MAJOR DEPRESSIVE DISORDER (H): ICD-10-CM

## 2024-05-28 DIAGNOSIS — K21.9 GASTROESOPHAGEAL REFLUX DISEASE WITHOUT ESOPHAGITIS: ICD-10-CM

## 2024-05-28 DIAGNOSIS — F41.1 GAD (GENERALIZED ANXIETY DISORDER): Chronic | ICD-10-CM

## 2024-05-29 RX ORDER — CITALOPRAM HYDROBROMIDE 40 MG/1
40 TABLET ORAL DAILY
Qty: 30 TABLET | Refills: 0 | Status: SHIPPED | OUTPATIENT
Start: 2024-05-29 | End: 2024-06-18

## 2024-05-29 NOTE — TELEPHONE ENCOUNTER
Disp Refills Start End GARY   citalopram (CELEXA) 40 MG tablet 30 tablet 0 4/18/2024 -- No      Disp Refills Start End GARY   omeprazole (PRILOSEC) 20 MG DR capsule 30 capsule 0 11/6/2023 -- No     Last Office Visit: 04/18/2024  Future Office visit:       Routing refill request to provider for review/approval because:

## 2024-05-29 NOTE — TELEPHONE ENCOUNTER
Attempt # 1  Outcome: Left Message   Comment: LVM for pt to call to schedule a med review/follow-up with Dr. Perez.

## 2024-05-31 NOTE — TELEPHONE ENCOUNTER
Attempt # 2  Outcome: Left Message   Comment: LVM for pt to call to schedule follow-up/med review with PCP.

## 2024-06-04 NOTE — TELEPHONE ENCOUNTER
Attempt # 3  Outcome: Left Message   Comment: LVM for pt to call to schedule med review with Dr. Perez.  Sending a letter.

## 2024-06-18 ENCOUNTER — MYC MEDICAL ADVICE (OUTPATIENT)
Dept: PULMONOLOGY | Facility: OTHER | Age: 19
End: 2024-06-18

## 2024-06-18 ENCOUNTER — OFFICE VISIT (OUTPATIENT)
Dept: FAMILY MEDICINE | Facility: OTHER | Age: 19
End: 2024-06-18
Attending: STUDENT IN AN ORGANIZED HEALTH CARE EDUCATION/TRAINING PROGRAM
Payer: COMMERCIAL

## 2024-06-18 VITALS
SYSTOLIC BLOOD PRESSURE: 108 MMHG | WEIGHT: 132 LBS | DIASTOLIC BLOOD PRESSURE: 60 MMHG | HEART RATE: 78 BPM | RESPIRATION RATE: 16 BRPM | HEIGHT: 62 IN | OXYGEN SATURATION: 98 % | BODY MASS INDEX: 24.29 KG/M2 | TEMPERATURE: 97.7 F

## 2024-06-18 DIAGNOSIS — R06.83 SNORING: ICD-10-CM

## 2024-06-18 DIAGNOSIS — R35.0 URINARY FREQUENCY: ICD-10-CM

## 2024-06-18 DIAGNOSIS — F41.1 GAD (GENERALIZED ANXIETY DISORDER): Primary | Chronic | ICD-10-CM

## 2024-06-18 DIAGNOSIS — R74.01 ELEVATED ALT MEASUREMENT: ICD-10-CM

## 2024-06-18 DIAGNOSIS — E61.1 IRON DEFICIENCY: ICD-10-CM

## 2024-06-18 DIAGNOSIS — R68.89 COLD INTOLERANCE: ICD-10-CM

## 2024-06-18 DIAGNOSIS — E80.0 EPP (ERYTHROPOIETIC PROTOPORPHYRIA) (H): Chronic | ICD-10-CM

## 2024-06-18 DIAGNOSIS — F33.1 MODERATE EPISODE OF RECURRENT MAJOR DEPRESSIVE DISORDER (H): ICD-10-CM

## 2024-06-18 DIAGNOSIS — F42.2 MIXED OBSESSIONAL THOUGHTS AND ACTS: Chronic | ICD-10-CM

## 2024-06-18 DIAGNOSIS — R40.0 DAYTIME SOMNOLENCE: ICD-10-CM

## 2024-06-18 LAB
ALBUMIN SERPL BCG-MCNC: 4.6 G/DL (ref 3.5–5.2)
ALBUMIN UR-MCNC: NEGATIVE MG/DL
ALP SERPL-CCNC: 79 U/L (ref 40–150)
ALT SERPL W P-5'-P-CCNC: 30 U/L (ref 0–50)
APPEARANCE UR: CLEAR
AST SERPL W P-5'-P-CCNC: 30 U/L (ref 0–45)
BACTERIA #/AREA URNS HPF: ABNORMAL /HPF
BASOPHILS # BLD AUTO: 0 10E3/UL (ref 0–0.2)
BASOPHILS NFR BLD AUTO: 0 %
BILIRUB DIRECT SERPL-MCNC: <0.2 MG/DL (ref 0–0.3)
BILIRUB SERPL-MCNC: 0.3 MG/DL
BILIRUB UR QL STRIP: NEGATIVE
COLOR UR AUTO: ABNORMAL
EOSINOPHIL # BLD AUTO: 0.1 10E3/UL (ref 0–0.7)
EOSINOPHIL NFR BLD AUTO: 1 %
ERYTHROCYTE [DISTWIDTH] IN BLOOD BY AUTOMATED COUNT: 17.2 % (ref 10–15)
GLUCOSE UR STRIP-MCNC: NEGATIVE MG/DL
HCT VFR BLD AUTO: 41.2 % (ref 35–47)
HGB BLD-MCNC: 13.6 G/DL (ref 11.7–15.7)
HGB UR QL STRIP: NEGATIVE
IMM GRANULOCYTES # BLD: 0 10E3/UL
IMM GRANULOCYTES NFR BLD: 0 %
IRON BINDING CAPACITY (ROCHE): 342 UG/DL (ref 240–430)
IRON SATN MFR SERPL: 13 % (ref 15–46)
IRON SERPL-MCNC: 44 UG/DL (ref 37–145)
KETONES UR STRIP-MCNC: NEGATIVE MG/DL
LEUKOCYTE ESTERASE UR QL STRIP: NEGATIVE
LYMPHOCYTES # BLD AUTO: 2.3 10E3/UL (ref 0.8–5.3)
LYMPHOCYTES NFR BLD AUTO: 27 %
MCH RBC QN AUTO: 25.3 PG (ref 26.5–33)
MCHC RBC AUTO-ENTMCNC: 33 G/DL (ref 31.5–36.5)
MCV RBC AUTO: 77 FL (ref 78–100)
MONOCYTES # BLD AUTO: 0.6 10E3/UL (ref 0–1.3)
MONOCYTES NFR BLD AUTO: 6 %
MUCOUS THREADS #/AREA URNS LPF: PRESENT /LPF
NEUTROPHILS # BLD AUTO: 5.6 10E3/UL (ref 1.6–8.3)
NEUTROPHILS NFR BLD AUTO: 65 %
NITRATE UR QL: NEGATIVE
NRBC # BLD AUTO: 0 10E3/UL
NRBC BLD AUTO-RTO: 0 /100
PH UR STRIP: 7 [PH] (ref 4.7–8)
PLATELET # BLD AUTO: 135 10E3/UL (ref 150–450)
PROT SERPL-MCNC: 7.3 G/DL (ref 6.4–8.3)
RBC # BLD AUTO: 5.37 10E6/UL (ref 3.8–5.2)
RBC URINE: 0 /HPF
SP GR UR STRIP: 1.03 (ref 1–1.03)
SQUAMOUS EPITHELIAL: 0 /HPF
TSH SERPL DL<=0.005 MIU/L-ACNC: 3.16 UIU/ML (ref 0.3–4.2)
UROBILINOGEN UR STRIP-MCNC: NORMAL MG/DL
WBC # BLD AUTO: 8.6 10E3/UL (ref 4–11)
WBC URINE: <1 /HPF

## 2024-06-18 PROCEDURE — 81001 URINALYSIS AUTO W/SCOPE: CPT | Mod: ZL | Performed by: STUDENT IN AN ORGANIZED HEALTH CARE EDUCATION/TRAINING PROGRAM

## 2024-06-18 PROCEDURE — 83550 IRON BINDING TEST: CPT | Performed by: STUDENT IN AN ORGANIZED HEALTH CARE EDUCATION/TRAINING PROGRAM

## 2024-06-18 PROCEDURE — 84443 ASSAY THYROID STIM HORMONE: CPT | Mod: ZL | Performed by: STUDENT IN AN ORGANIZED HEALTH CARE EDUCATION/TRAINING PROGRAM

## 2024-06-18 PROCEDURE — G0463 HOSPITAL OUTPT CLINIC VISIT: HCPCS

## 2024-06-18 PROCEDURE — 83550 IRON BINDING TEST: CPT | Mod: ZL | Performed by: STUDENT IN AN ORGANIZED HEALTH CARE EDUCATION/TRAINING PROGRAM

## 2024-06-18 PROCEDURE — 83540 ASSAY OF IRON: CPT | Performed by: STUDENT IN AN ORGANIZED HEALTH CARE EDUCATION/TRAINING PROGRAM

## 2024-06-18 PROCEDURE — 83540 ASSAY OF IRON: CPT | Mod: ZL | Performed by: STUDENT IN AN ORGANIZED HEALTH CARE EDUCATION/TRAINING PROGRAM

## 2024-06-18 PROCEDURE — 80076 HEPATIC FUNCTION PANEL: CPT | Performed by: STUDENT IN AN ORGANIZED HEALTH CARE EDUCATION/TRAINING PROGRAM

## 2024-06-18 PROCEDURE — 36415 COLL VENOUS BLD VENIPUNCTURE: CPT | Mod: ZL | Performed by: STUDENT IN AN ORGANIZED HEALTH CARE EDUCATION/TRAINING PROGRAM

## 2024-06-18 PROCEDURE — 84443 ASSAY THYROID STIM HORMONE: CPT | Performed by: STUDENT IN AN ORGANIZED HEALTH CARE EDUCATION/TRAINING PROGRAM

## 2024-06-18 PROCEDURE — 85025 COMPLETE CBC W/AUTO DIFF WBC: CPT | Performed by: STUDENT IN AN ORGANIZED HEALTH CARE EDUCATION/TRAINING PROGRAM

## 2024-06-18 PROCEDURE — 80076 HEPATIC FUNCTION PANEL: CPT | Mod: ZL | Performed by: STUDENT IN AN ORGANIZED HEALTH CARE EDUCATION/TRAINING PROGRAM

## 2024-06-18 PROCEDURE — G2211 COMPLEX E/M VISIT ADD ON: HCPCS | Performed by: STUDENT IN AN ORGANIZED HEALTH CARE EDUCATION/TRAINING PROGRAM

## 2024-06-18 PROCEDURE — 85025 COMPLETE CBC W/AUTO DIFF WBC: CPT | Mod: ZL | Performed by: STUDENT IN AN ORGANIZED HEALTH CARE EDUCATION/TRAINING PROGRAM

## 2024-06-18 PROCEDURE — 36415 COLL VENOUS BLD VENIPUNCTURE: CPT | Performed by: STUDENT IN AN ORGANIZED HEALTH CARE EDUCATION/TRAINING PROGRAM

## 2024-06-18 PROCEDURE — 99214 OFFICE O/P EST MOD 30 MIN: CPT | Performed by: STUDENT IN AN ORGANIZED HEALTH CARE EDUCATION/TRAINING PROGRAM

## 2024-06-18 PROCEDURE — 81001 URINALYSIS AUTO W/SCOPE: CPT | Performed by: STUDENT IN AN ORGANIZED HEALTH CARE EDUCATION/TRAINING PROGRAM

## 2024-06-18 RX ORDER — HYDROXYZINE HYDROCHLORIDE 25 MG/1
25 TABLET, FILM COATED ORAL
Qty: 30 TABLET | Refills: 1 | Status: SHIPPED | OUTPATIENT
Start: 2024-06-18

## 2024-06-18 RX ORDER — BUSPIRONE HYDROCHLORIDE 5 MG/1
5 TABLET ORAL 2 TIMES DAILY
Qty: 60 TABLET | Refills: 0 | Status: SHIPPED | OUTPATIENT
Start: 2024-06-18 | End: 2024-09-13

## 2024-06-18 RX ORDER — CITALOPRAM HYDROBROMIDE 40 MG/1
40 TABLET ORAL DAILY
Qty: 30 TABLET | Refills: 3 | Status: SHIPPED | OUTPATIENT
Start: 2024-06-18

## 2024-06-18 ASSESSMENT — ANXIETY QUESTIONNAIRES
GAD7 TOTAL SCORE: 17
8. IF YOU CHECKED OFF ANY PROBLEMS, HOW DIFFICULT HAVE THESE MADE IT FOR YOU TO DO YOUR WORK, TAKE CARE OF THINGS AT HOME, OR GET ALONG WITH OTHER PEOPLE?: SOMEWHAT DIFFICULT
5. BEING SO RESTLESS THAT IT IS HARD TO SIT STILL: SEVERAL DAYS
2. NOT BEING ABLE TO STOP OR CONTROL WORRYING: NEARLY EVERY DAY
1. FEELING NERVOUS, ANXIOUS, OR ON EDGE: SEVERAL DAYS
3. WORRYING TOO MUCH ABOUT DIFFERENT THINGS: NEARLY EVERY DAY
6. BECOMING EASILY ANNOYED OR IRRITABLE: NEARLY EVERY DAY
7. FEELING AFRAID AS IF SOMETHING AWFUL MIGHT HAPPEN: NEARLY EVERY DAY
GAD7 TOTAL SCORE: 17
GAD7 TOTAL SCORE: 17
4. TROUBLE RELAXING: NEARLY EVERY DAY
7. FEELING AFRAID AS IF SOMETHING AWFUL MIGHT HAPPEN: NEARLY EVERY DAY
IF YOU CHECKED OFF ANY PROBLEMS ON THIS QUESTIONNAIRE, HOW DIFFICULT HAVE THESE PROBLEMS MADE IT FOR YOU TO DO YOUR WORK, TAKE CARE OF THINGS AT HOME, OR GET ALONG WITH OTHER PEOPLE: SOMEWHAT DIFFICULT

## 2024-06-18 ASSESSMENT — SLEEP AND FATIGUE QUESTIONNAIRES
HOW LIKELY ARE YOU TO NOD OFF OR FALL ASLEEP IN A CAR, WHILE STOPPED FOR A FEW MINUTES IN TRAFFIC: WOULD NEVER DOZE
HOW LIKELY ARE YOU TO NOD OFF OR FALL ASLEEP WHILE SITTING AND TALKING TO SOMEONE: SLIGHT CHANCE OF DOZING
HOW LIKELY ARE YOU TO NOD OFF OR FALL ASLEEP WHILE SITTING QUIETLY AFTER LUNCH WITHOUT ALCOHOL: SLIGHT CHANCE OF DOZING
HOW LIKELY ARE YOU TO NOD OFF OR FALL ASLEEP WHILE SITTING AND READING: SLIGHT CHANCE OF DOZING
HOW LIKELY ARE YOU TO NOD OFF OR FALL ASLEEP WHILE WATCHING TV: MODERATE CHANCE OF DOZING
HOW LIKELY ARE YOU TO NOD OFF OR FALL ASLEEP WHILE LYING DOWN TO REST IN THE AFTERNOON WHEN CIRCUMSTANCES PERMIT: HIGH CHANCE OF DOZING
HOW LIKELY ARE YOU TO NOD OFF OR FALL ASLEEP WHILE SITTING INACTIVE IN A PUBLIC PLACE: SLIGHT CHANCE OF DOZING
HOW LIKELY ARE YOU TO NOD OFF OR FALL ASLEEP WHEN YOU ARE A PASSENGER IN A CAR FOR AN HOUR WITHOUT A BREAK: MODERATE CHANCE OF DOZING

## 2024-06-18 ASSESSMENT — PATIENT HEALTH QUESTIONNAIRE - PHQ9
SUM OF ALL RESPONSES TO PHQ QUESTIONS 1-9: 16
10. IF YOU CHECKED OFF ANY PROBLEMS, HOW DIFFICULT HAVE THESE PROBLEMS MADE IT FOR YOU TO DO YOUR WORK, TAKE CARE OF THINGS AT HOME, OR GET ALONG WITH OTHER PEOPLE: NOT DIFFICULT AT ALL
SUM OF ALL RESPONSES TO PHQ QUESTIONS 1-9: 16

## 2024-06-18 ASSESSMENT — PAIN SCALES - GENERAL: PAINLEVEL: NO PAIN (0)

## 2024-06-18 ASSESSMENT — ENCOUNTER SYMPTOMS: NERVOUS/ANXIOUS: 1

## 2024-06-18 NOTE — Clinical Note
Nicholas Barnes - this patient has EPP (erythropoietic protoporphyria). Is this something you would follow or would you recommend a referral to the ? She did see pediatric hemonc at the  in the past. Thanks!

## 2024-06-18 NOTE — PROGRESS NOTES
Assessment & Plan     ELIZABETH (generalized anxiety disorder)  Improved, however not perfectly controlled.  Does seem to have a predominant social anxiety but worry does carry over and become pervasive and other aspects of life.  Doing well with Celexa 40 mg once daily.  Plan to augment further with BuSpar 5 mg twice daily.  Discussed possible side effects and benefit.  Refilled hydroxyzine to help with occasional panic symptoms and sleep.  Tolerating well.  - busPIRone (BUSPAR) 5 MG tablet; Take 1 tablet (5 mg) by mouth 2 times daily  - citalopram (CELEXA) 40 MG tablet; Take 1 tablet (40 mg) by mouth daily  - hydrOXYzine HCl (ATARAX) 25 MG tablet; Take 1 tablet (25 mg) by mouth nightly as needed for anxiety    Moderate episode of recurrent major depressive disorder (H)  Much improved even though PHQ-9 score remains elevated.  She has increased motivation, is leaving the house more, brighter affect.  Suspect therapy is also playing a role but so far Celexa has been the best tolerated medication and the most effective.    Mixed obsessional thoughts and acts  Stable and doing well on Celexa 40 mg.  Does have OCD diagnosis from recent diagnostic assessment with therapist.    Daytime somnolence  Concern for possible obstructive sleep apnea with snoring, episodes of apnea and sleep, and daytime fatigue/somnolence.  Family history of this in father.  Referral placed for sleep study.  Update thyroid level today.  - Adult Sleep Eval & Management  Referral; Future  - TSH with free T4 reflex; Future  - TSH with free T4 reflex    Snoring  See above.  - Adult Sleep Eval & Management  Referral; Future    Cold intolerance  Will evaluate for thyroid abnormality.  Iron level in March 2024 was borderline but normal.  - TSH with free T4 reflex; Future  - Iron and iron binding capacity; Future  - Iron and iron binding capacity  - TSH with free T4 reflex    Elevated ALT measurement  Recheck today.  May be related to her  erythropoietic prtoorphyria.  - Hepatic panel (Albumin, ALT, AST, Bili, Alk Phos, TP); Future  - Hepatic panel (Albumin, ALT, AST, Bili, Alk Phos, TP)    Urinary frequency  No obvious etiology.  No overt signs of infection.  Will examine UA.  Pap next month - consider BV swab if discharge at that time   - UA with Microscopic reflex to Culture - [place]; Future  - UA with Microscopic reflex to Culture - [place]    Iron deficiency  History of this.  Recheck CBC and iron today.  Reports compliance with iron.  - CBC with platelets and differential; Future  - CBC with platelets and differential    EPP (erythropoietic protoporphyria) (H)  Reviewed 3/18/2020 note from pediatric hematology oncology.  Diagnosed around 1 years of age with severe sun sensitivity and burning.  Family history of EPP.  Continues to avoid the sun.  Did have a cholecystectomy in 2023.  Vitamin D level up-to-date, continues on supplementation.  Updating CBC and LFTs today.  Will reach out to hematology here in [place] to see if they would see this or if she will need referral to [facility].      The longitudinal plan of care for the diagnosis(es)/condition(s) as documented were addressed during this visit. Due to the added complexity in care, I will continue to support Hemalatha in the subsequent management and with ongoing continuity of care.        Tanvir Penny is a 18 year old, presenting for the following health issues:  Anxiety and Depression        6/18/2024     3:39 PM   Additional Questions   Roomed by April   Accompanied by self         6/18/2024     3:39 PM   Patient Reported Additional Medications   Patient reports taking the following new medications none     Anxiety    History of Present Illness       Mental Health Follow-up:  Patient presents to follow-up on Depression & Anxiety.Patient's depression since last visit has been:  Better  The patient is having other symptoms associated with depression.  Patient's anxiety  since last visit has been:  Good  The patient is having other symptoms associated with anxiety.  Any significant life events: No  Patient is feeling anxious or having panic attacks.  Patient has no concerns about alcohol or drug use.    She eats 0-1 servings of fruits and vegetables daily.She consumes 1 sweetened beverage(s) daily.She exercises with enough effort to increase her heart rate 9 or less minutes per day.  She exercises with enough effort to increase her heart rate 3 or less days per week.   She is taking medications regularly.       Depression and Anxiety   How are you doing with your depression since your last visit? Improved   How are you doing with your anxiety since your last visit?  Improved   Are you having other symptoms that might be associated with depression or anxiety? No  Have you had a significant life event? Grief or Loss   Do you have any concerns with your use of alcohol or other drugs? No    Celexa increased to 40mg 4/18/24   Less of slump, able to get up and do things - depression better  Spending time with family, dad   Trying to get out more  Using hydroxyzine as needed at night   Anxiety flares at times but is more comfortable  Some panic at places but has courage to go out   Happy where anxiety is for now for the most part - unsure if something more we can do   Anxiety will still flare daily, worse with going places, gets stomach upset  Therapy going well - has been very helpful     Very tired. Does not feel well rested in the morning. Does snore. Will stop breathing in her sleep. Dad may have sleep apnea.   She is taking vitamin D.   Feels constipated occasionally.   Wondering about thyroid.     Irregular menses. Missed a month, then recurred. Will happen every few months. Period only lasts four days, is heavy. This is how periods have been. Wondering again possible thyroid issue.      Urinary frequency. Seems to have to go a lot. Started a few months ago. Has waxed and waned over  years. Does get ovulation pain each month. No pelvic pian otherwise. No dysuria. No blood in urine.     Wondering about a referral to a specialist.  Previously saw pediatric hematology/oncology but when she turned 16 she was not able to see them anymore.    Social History     Tobacco Use    Smoking status: Never    Smokeless tobacco: Never   Vaping Use    Vaping status: Never Used   Substance Use Topics    Alcohol use: Never     Alcohol/week: 0.0 standard drinks of alcohol    Drug use: Never         4/4/2024    11:30 AM 4/18/2024     2:11 PM 6/18/2024     3:37 PM   PHQ   PHQ-9 Total Score 16 17 16   Q9: Thoughts of better off dead/self-harm past 2 weeks Not at all Not at all Not at all         4/4/2024    11:31 AM 4/18/2024     2:11 PM 6/18/2024     3:40 PM   ELIZABETH-7 SCORE   Total Score 19 (severe anxiety) 20 (severe anxiety) 17 (severe anxiety)   Total Score 19 20 17         6/18/2024     3:37 PM   Last PHQ-9   1.  Little interest or pleasure in doing things 2   2.  Feeling down, depressed, or hopeless 1   3.  Trouble falling or staying asleep, or sleeping too much 3   4.  Feeling tired or having little energy 3   5.  Poor appetite or overeating 3   6.  Feeling bad about yourself 1   7.  Trouble concentrating 2   8.  Moving slowly or restless 1   Q9: Thoughts of better off dead/self-harm past 2 weeks 0   PHQ-9 Total Score 16         6/18/2024     3:40 PM   ELIZABETH-7    1. Feeling nervous, anxious, or on edge 1   2. Not being able to stop or control worrying 3   3. Worrying too much about different things 3   4. Trouble relaxing 3   5. Being so restless that it is hard to sit still 1   6. Becoming easily annoyed or irritable 3   7. Feeling afraid, as if something awful might happen 3   ELIZABETH-7 Total Score 17   If you checked any problems, how difficult have they made it for you to do your work, take care of things at home, or get along with other people? Somewhat difficult           Objective    /60 (BP Location:  "Right arm, Patient Position: Sitting, Cuff Size: Adult Regular)   Pulse 78   Temp 97.7  F (36.5  C) (Tympanic)   Resp 16   Ht 1.575 m (5' 2\")   Wt 59.9 kg (132 lb)   LMP  (LMP Unknown)   SpO2 98%   BMI 24.14 kg/m    Body mass index is 24.14 kg/m .  Physical Exam  Constitutional:       General: She is not in acute distress.     Appearance: Normal appearance.   Neck:      Thyroid: No thyroid mass, thyromegaly or thyroid tenderness.   Cardiovascular:      Rate and Rhythm: Normal rate and regular rhythm.      Heart sounds: No murmur heard.  Pulmonary:      Effort: Pulmonary effort is normal.      Breath sounds: Normal breath sounds. No wheezing, rhonchi or rales.   Abdominal:      General: Bowel sounds are normal.      Palpations: Abdomen is soft. There is no hepatomegaly, splenomegaly or mass.      Tenderness: There is no abdominal tenderness.   Musculoskeletal:      Cervical back: Neck supple.      Right lower leg: No edema.      Left lower leg: No edema.   Lymphadenopathy:      Cervical: No cervical adenopathy.   Neurological:      General: No focal deficit present.      Mental Status: She is alert and oriented to person, place, and time.   Psychiatric:         Mood and Affect: Mood normal.         Behavior: Behavior normal.            Results for orders placed or performed in visit on 06/18/24   UA with Microscopic reflex to Culture - [place]     Status: Abnormal    Specimen: Urine, Clean Catch   Result Value Ref Range    Color Urine Light Yellow Colorless, Straw, Light Yellow, Yellow    Appearance Urine Clear Clear    Glucose Urine Negative Negative mg/dL    Bilirubin Urine Negative Negative    Ketones Urine Negative Negative mg/dL    Specific Gravity Urine 1.027 1.003 - 1.035    Blood Urine Negative Negative    pH Urine 7.0 4.7 - 8.0    Protein Albumin Urine Negative Negative mg/dL    Urobilinogen Urine Normal Normal, 2.0 mg/dL    Nitrite Urine Negative Negative    Leukocyte Esterase Urine Negative " Negative    Bacteria Urine Few (A) None Seen /HPF    Mucus Urine Present (A) None Seen /LPF    RBC Urine 0 <=2 /HPF    WBC Urine <1 <=5 /HPF    Squamous Epithelials Urine 0 <=1 /HPF    Narrative    Urine Culture not indicated   CBC with platelets and differential     Status: Abnormal   Result Value Ref Range    WBC Count 8.6 4.0 - 11.0 10e3/uL    RBC Count 5.37 (H) 3.80 - 5.20 10e6/uL    Hemoglobin 13.6 11.7 - 15.7 g/dL    Hematocrit 41.2 35.0 - 47.0 %    MCV 77 (L) 78 - 100 fL    MCH 25.3 (L) 26.5 - 33.0 pg    MCHC 33.0 31.5 - 36.5 g/dL    RDW 17.2 (H) 10.0 - 15.0 %    Platelet Count 135 (L) 150 - 450 10e3/uL    % Neutrophils 65 %    % Lymphocytes 27 %    % Monocytes 6 %    % Eosinophils 1 %    % Basophils 0 %    % Immature Granulocytes 0 %    NRBCs per 100 WBC 0 <1 /100    Absolute Neutrophils 5.6 1.6 - 8.3 10e3/uL    Absolute Lymphocytes 2.3 0.8 - 5.3 10e3/uL    Absolute Monocytes 0.6 0.0 - 1.3 10e3/uL    Absolute Eosinophils 0.1 0.0 - 0.7 10e3/uL    Absolute Basophils 0.0 0.0 - 0.2 10e3/uL    Absolute Immature Granulocytes 0.0 <=0.4 10e3/uL    Absolute NRBCs 0.0 10e3/uL   CBC with platelets and differential     Status: Abnormal    Narrative    The following orders were created for panel order CBC with platelets and differential.  Procedure                               Abnormality         Status                     ---------                               -----------         ------                     CBC with platelets and d...[[id number]]  Abnormal            Final result               RBC and Platelet Morphology[[id number]]                                                   Please view results for these tests on the individual orders.             Signed Electronically by: Mago Perez MD    Answers submitted by the patient for this visit:  Patient Health Questionnaire (Submitted on 6/18/2024)  If you checked off any problems, how difficult have these problems made it for you to do your work, take care of  things at home, or get along with other people?: Not difficult at all  PHQ9 TOTAL SCORE: 16  ELIZABETH-7 (Submitted on 6/18/2024)  ELIZABETH 7 TOTAL SCORE: 17  Depression / Anxiety Questionnaire (Submitted on 6/18/2024)  Chief Complaint: Chronic problems general questions HPI Form  Depression/Anxiety: Depression & Anxiety  Depression & Anxiety (Submitted on 6/18/2024)  Chief Complaint: Chronic problems general questions HPI Form  Status since last visit:: better  Anxiety since last: : good  Other associated symptoms of depression:: Yes  Other associated symotome: : Yes  Significant life event: : No  Anxious:: Yes  Current substance use:: No  General Questionnaire (Submitted on 6/18/2024)  Chief Complaint: Chronic problems general questions HPI Form  How many servings of fruits and vegetables do you eat daily?: 0-1  On average, how many sweetened beverages do you drink each day (Examples: soda, juice, sweet tea, etc.  Do NOT count diet or artificially sweetened beverages)?: 1  How many minutes a day do you exercise enough to make your heart beat faster?: 9 or less  How many days a week do you exercise enough to make your heart beat faster?: 3 or less  How many days per week do you miss taking your medication?: 0

## 2024-06-18 NOTE — PATIENT INSTRUCTIONS
Labs today   Buspar 5mg twice daily to start   Follow up one month.   Will be due for pap - physical next month   Sleep medicine will call for consult [facility]

## 2024-06-20 ENCOUNTER — TRANSCRIBE ORDERS (OUTPATIENT)
Dept: OTHER | Age: 19
End: 2024-06-20

## 2024-06-20 DIAGNOSIS — E80.0 EPP (ERYTHROPOIETIC PROTOPORPHYRIA) (H): Primary | ICD-10-CM

## 2024-06-21 ENCOUNTER — PATIENT OUTREACH (OUTPATIENT)
Dept: ONCOLOGY | Facility: CLINIC | Age: 19
End: 2024-06-21
Payer: COMMERCIAL

## 2024-06-21 NOTE — PROGRESS NOTES
"June 21, 2024    Hematology/Oncology  referral reviewed.     Pertinent labs -- BOOKMARKED  Referring provider visit note:  \"EPP (erythropoietic protoporphyria) (H)  Reviewed 3/18/2020 note from pediatric hematology oncology.  Diagnosed around 1 years of age with severe sun sensitivity and burning.  Family history of EPP.  Continues to avoid the sun.  Did have a cholecystectomy in 2023.  Vitamin D level up-to-date, continues on supplementation.  Updating CBC and LFTs today.  Will reach out to hematology here in [place] to see if they would see this or if she will need referral to [facility].\" -- BOOKMARKED    3/18/20 Virtual Visit with Dr. Barnes (Peds Heme/Onc)- will reach out to Dr. Barnes to see if appropriate to schedule with him given pt is a previous pt vs schedule with another hematology provider with Adult Hematology. Will follow-up pending input from Dr. Barnes.     Addendum: Per Dr. Barnes, \"Let's look at Kong Logan's and my schedules for new patients and figure out who can see her the earliest. I am open to seeing her     Beto \"    Will route to NPS to offer visit with Dr. Barnes/Doreen pending pt provider preference/availability.     Hematology intake scheduling team ([facility]PS phone number [phone] Monday - Friday 8am - 4:30 pm) will contact pt in the next 1-2 business days to schedule the consultation.    Diamond Casey, BSN, RN, PHN, OCN  Hematology/Oncology Nurse Navigator  [facility]  [phone]            "

## 2024-06-21 NOTE — PROGRESS NOTES
06/18/24 2024   Reason For Your Visit   Please briefly describe the main reason(s) for your sleep visit Waking up nauseous most of the nights, some nights i will wake up gasping for air but its not every night. I have a rough time sleeping, i take awhile to fall asleep and when i do i normally wake up a couple times. Then throught day im constantly tired.   Approximately when did this problem start At a young age but recently more often and worse.   What are your goals for this visit To see why im not capable of sleeping most nights and why im constantly nauseous, and would like to figure out how to get better sleep overall.   Time in Bed - Work Or School Days   Do you work or go to school No   What time do you usually get into bed 9-10 pm but some nights way later like 12 or so.   About how long does it take you to fall asleep Many hours, i would say about 3 or 4 hours.   How often do you have trouble falling asleep Every night but some nights im able to take my sleeping meds and it helps sometimes to fall asleep a little faster.   How often do you wake up during the night 2 or 3 times   What wakes you up at night Use the bathroom;Anxiety;Uncertain   How often do you have trouble falling back to sleep Once im awake im staying awake unless im severely tired.   About how long does it take to fall back to sleep Depends, 1 or 2 hours if im wide awake.   What do you usually do if you have trouble getting back to sleep Just stay up, nothing really helps.   What time do you usually get out of bed to start your day 11   Do you use an alarm No   Time in Bed - Weekends/Non-work Days/All Other Days   What time do you usually get into bed 9 or 10   About how long does it take you to fall asleep 3 or 4 hours   What time do you usually get out of bed to start your day 11   Do you use an alarm No   Sleep Need   On average, about how much sleep do you think you get 5 or 6 hours if bailey   About how much sleep do you think you  need A couple hours im pretty used to staying up with no sleep   Sleep Position   Which sleep positions do you prefer Stomach   Do you do any of the following activities in bed Read;Eat;Watch TV;Use phone, computer, or tablet   How often do you take a nap on purpose 1 a week, i try not napping   About how long are your naps An hour or so   Do you feel better after naps No   How often do you doze off unintentionally Once an awhile, latley ill just randomly fall asleep on my phone wide awake then ill be sleeping but im sure everyone does maybe?   Have you ever had a driving accident or near-miss due to sleepiness/drowsiness No   Sleep Disruptions - Breathing/Snoring   Do you snore Yes   Do other people complain about your snoring Yes   Have you been told you stop breathing in your sleep No   Do you have issues with any of the following Morning mouth dryness;Heartburn or reflux at night;Getting up to urinate more than once   Sleep Disruptions - Movement   Do you get pain, discomfort, with an urge to move No   Does it happen when you are resting No   Does it get better if you move around No   Does it happen more at night No   Have you been told you kick your legs at night Yes   Sleep Disruptions - Behaviours in Sleep   Do you ever experience sudden muscle weakness during the day Yes   4) Is there anything else you would like your sleep provider to know I dont think so!   Caffeine, Alcohol and Other Substances   How many caffeinated beverages (coffee, tea, soda, energy drinks) per day 0 i cant handle caffeine.   What time of day is your last caffeine use None   List any prescribed or over the counter stimulants that you take I dint think so? Im really familiar with those.   List any prescribed or over the counter sleep medication you take Hydroxyzine   List previous sleep medications you have tried None, this one if the first.   Do you drink alcohol to help you sleep No   Do you drink alcohol near bedtime No   Family  History   Has any family member been diagnosed with a sleep disorder No   In the last TWO WEEKS have you experienced any of the following symptoms?   Fevers No   Night Sweats Yes   Weight Gain Yes   Pain at Night No   Double Vision No   Changes in Vision No   Difficulty Breathing through Nose No   Sore Throat in Morning No   Dry Mouth in the Morning Yes   Shortness of Breath Lying Flat No   Shortness of Breath With Activity Yes   Awakening with Shortness of Breath No   Increased Cough No   Heart Racing at Night Yes   Swelling in Feet or Legs No   Diarrhea at Night No   Heartburn at Night Yes   Urinating More than Once at Night Yes   Losing Control of Urine at Night No   Joint Pains at Night Yes   Headaches in Morning No   Weakness in Arms or Legs No   Depressed Mood Yes   Anxiety Yes         6/18/2024     8:27 PM    Dar[place] Sleepiness Scale ( ANTIONETTE Nuñez  [id number]<br>ESS - [place]/English - Final version - 21 Nov 07 - [facility] [address].)   Sitting and reading Slight chance of dozing   Watching TV Moderate chance of dozing   Sitting, inactive in a public place (e.g. a theatre or a meeting) Slight chance of dozing   As a passenger in a car for an hour without a break Moderate chance of dozing   Lying down to rest in the afternoon when circumstances permit High chance of dozing   Sitting and talking to someone Slight chance of dozing   Sitting quietly after a lunch without alcohol Slight chance of dozing   In a car, while stopped for a few minutes in traffic Would never doze   Darlington Score (MC) 11   Darlington Score (Sleep) 11         6/18/2024     8:25 PM   Insomnia Severity Index (MICAH)   Difficulty falling asleep 3   Difficulty staying asleep 2   Problems waking up too early 1   How SATISFIED/DISSATISFIED are you with your CURRENT sleep pattern? 4   How NOTICEABLE to others do you think your sleep problem is in terms of impairing the quality of your life? 2   How WORRIED/DISTRESSED are you about your current sleep  problem? 2   To what extent do you consider your sleep problem to INTERFERE with your daily functioning (e.g. daytime fatigue, mood, ability to function at work/daily chores, concentration, memory, mood, etc.) CURRENTLY? 3   MICAH Total Score 17         6/18/2024     8:26 PM   STOP BANG Questionnaire (  2008, the American Society of Anesthesiologists, Inc[employer]   1. Snoring - Do you snore loudly (louder than talking or loud enough to be heard through closed doors)? Yes   2. Tired - Do you often feel tired, fatigued, or sleepy during daytime? Yes   3. Observed - Has anyone observed you stop breathing during your sleep? No   4. Blood pressure - Do you have or are you being treated for high blood pressure? No   5. BMI - BMI more than 35 kg/m2? No   6. Age - Age over 50 yr old? No   7. Neck circumference - Neck circumference greater than 40 cm? No   8. Gender - Gender male? No   STOP BANG Score (MC): 3 (High risk of LUZ MARINA)

## 2024-06-23 ENCOUNTER — MYC MEDICAL ADVICE (OUTPATIENT)
Dept: FAMILY MEDICINE | Facility: OTHER | Age: 19
End: 2024-06-23

## 2024-06-24 NOTE — TELEPHONE ENCOUNTER
6/24/2024 4:35 PM  Writer called patient regarding my chart message. See message. Patient didn't answer left VM provided call back number.     Alia SCHNEIDER RN, Care Coordinator

## 2024-07-01 NOTE — TELEPHONE ENCOUNTER
Chart review prior to sleep testing.    Patient Summary:  19 year old female who is referred for daytime fatigue, chronic insomnia.    Patient Active Problem List    Diagnosis Date Noted    Elevated ALT measurement 06/18/2024     Priority: Medium    Snoring 06/18/2024     Priority: Medium    Depression, unspecified depression type 02/16/2023     Priority: Medium    Mixed obsessional thoughts and acts 02/16/2023     Priority: Medium    Vitamin D deficiency 02/16/2023     Priority: Medium    Menorrhagia 02/16/2023     Priority: Medium    ELIZABETH (generalized anxiety disorder) 12/21/2022     Priority: Medium     Therapy at [facility]      Iron deficiency 02/13/2020     Priority: Medium    EPP (erythropoietic protoporphyria) (H)      Priority: Medium     Overview:   Seen by Dr. Montes 8/16/2006.  Diagnosis confirmed at [facility]. May not be the congenital type.  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    Needs COMP, CBC with diff, pt/PTT yearly. Check iron studies with next draw.      Dermatitis, atopic 02/08/2006     Priority: Medium       Current Outpatient Medications   Medication Sig Dispense Refill    busPIRone (BUSPAR) 5 MG tablet Take 1 tablet (5 mg) by mouth 2 times daily 60 tablet 0    citalopram (CELEXA) 40 MG tablet Take 1 tablet (40 mg) by mouth daily 30 tablet 3    ferrous sulfate (SV IRON) 325 (65 Fe) MG tablet TAKE 1 TABLET BY MOUTH EVERY OTHER DAY --  DO  NOT  TAKE  WITH  PPI  OR  ANTACID 90 tablet 3    hydrocortisone 2.5 % cream Apply topically 2 times daily 30 g 1    hydrOXYzine HCl (ATARAX) 25 MG tablet Take 1 tablet (25 mg) by mouth nightly as needed for anxiety 30 tablet 1    IBUPROFEN PO Take by mouth as needed for moderate pain      omeprazole (PRILOSEC) 20 MG DR capsule Take 1 capsule by mouth once daily 30 capsule 0    vitamin D3 (CHOLECALCIFEROL) 50 mcg (2000 units) tablet Take 1 tablet (50 mcg) by mouth daily 90 tablet 1     No current facility-administered medications for this visit.  "      Pertinent PMHx of vit D deficiency, iron deficiency, EPP (erythropoietic protoporphyria), ELIZABETH, depression, mixed obsessional thoughts / acts.    Ferritin 22 on 3/27/2023.  Med list does include    STOP-BANG score of 3, with unknown neck circumference.  [place] score of 11.  MICAH: 17    BMI of Estimated body mass index is 24.14 kg/m  as calculated from the following:    Height as of an earlier encounter on 6/18/24: 1.575 m (5' 2\").    Weight as of an earlier encounter on 6/18/24: 59.9 kg (132 lb).     Chief concern per questionnaire: \"Waking up nauseous most of the nights, some nights i will wake up gasping for air but its not every night. I have a rough time sleeping, i take awhile to fall asleep and when i do i normally wake up a couple times. Then throught day im constantly tired. \"    Duration of symptoms:  \"At a young age but recently more often and worse \"    Goals for visit per questionnaire: \"To see why im not capable of sleeping most nights and why im constantly nauseous, and would like to figure out how to get better sleep overall. \"    Sleep pattern:  Workdays.  9-10pm to as late as [place], up at 11am.  Weekends.  same.  Time to fall asleep: ~3-4 hours.  Awakenings: 2-3 times per night, 1-2+ hours to return to sleep.  Average total sleep time:  5-6 hours  Napping.  1 days per week, 1 hours per nap.    Yes for read, eat, TV, phone in bed.    No for RLS screen.  No for sleep walking.  No for dream enactment behavior.  No for bruxism.    No for morning headaches.  Yes for snoring.  No for observed apnea.  No for FHx of LUZ MARINA.    Caffeine use:  No for 3+ per day.  No for within 6 hours of bed.    A/P:    1.)  High likelihood of LUZ MARINA with STOP-BANG score of 3.   - Would appear to be candidate for either home sleep testing or in-lab PSG.    2.)  Chronic sleep onset and maintenance insomnia    Significant medical comorbidities of erythropoietic Protoporphyria with low normal ferritin (22 on 3/27/2023) and is " currently on oral iron supplementation.    I do suspect this is multifactorial with components of:  Significant psychophysiological components including inadequate sleep hygiene (read, eat, TV, phone in bed), unclear stimulus control, significant sleep expansion (in bed for 11-13 hours)  Potential RLS, PLMD  Comorbid erythropoietic protoporphyria, mood disorders    I would recommend maintaining sleep diaries for 2 weeks prior to visit.  We will also review the amount and frequency of her oral iron supplementation.    ---  This note was written with the assistance of the Dragon voice-dictation technology software. The final document, although reviewed, may contain errors. For corrections, please contact the office.    Tanner Chacko MD    Sleep Medicine  [place], [place]  Main Office: [phone]  [facility] [place], [place]  [address] [facility], [address]  Schedule visits: [phone]  Main Office: [phone]  Fax: [phone]

## 2024-07-06 ENCOUNTER — HEALTH MAINTENANCE LETTER (OUTPATIENT)
Age: 19
End: 2024-07-06

## 2024-09-13 ENCOUNTER — TELEPHONE (OUTPATIENT)
Dept: FAMILY MEDICINE | Facility: OTHER | Age: 19
End: 2024-09-13

## 2024-09-13 ENCOUNTER — OFFICE VISIT (OUTPATIENT)
Dept: FAMILY MEDICINE | Facility: OTHER | Age: 19
End: 2024-09-13
Attending: STUDENT IN AN ORGANIZED HEALTH CARE EDUCATION/TRAINING PROGRAM
Payer: COMMERCIAL

## 2024-09-13 VITALS
SYSTOLIC BLOOD PRESSURE: 100 MMHG | TEMPERATURE: 98 F | HEART RATE: 77 BPM | BODY MASS INDEX: 23.5 KG/M2 | OXYGEN SATURATION: 98 % | DIASTOLIC BLOOD PRESSURE: 66 MMHG | WEIGHT: 132.6 LBS | HEIGHT: 63 IN

## 2024-09-13 DIAGNOSIS — F33.1 MODERATE EPISODE OF RECURRENT MAJOR DEPRESSIVE DISORDER (H): ICD-10-CM

## 2024-09-13 DIAGNOSIS — F41.1 GAD (GENERALIZED ANXIETY DISORDER): Primary | ICD-10-CM

## 2024-09-13 DIAGNOSIS — G43.009 MIGRAINE WITHOUT AURA AND WITHOUT STATUS MIGRAINOSUS, NOT INTRACTABLE: ICD-10-CM

## 2024-09-13 DIAGNOSIS — H53.9 VISION CHANGES: ICD-10-CM

## 2024-09-13 DIAGNOSIS — F42.2 MIXED OBSESSIONAL THOUGHTS AND ACTS: ICD-10-CM

## 2024-09-13 DIAGNOSIS — G44.209 TENSION HEADACHE: ICD-10-CM

## 2024-09-13 PROCEDURE — 99214 OFFICE O/P EST MOD 30 MIN: CPT | Performed by: STUDENT IN AN ORGANIZED HEALTH CARE EDUCATION/TRAINING PROGRAM

## 2024-09-13 PROCEDURE — G2211 COMPLEX E/M VISIT ADD ON: HCPCS | Performed by: STUDENT IN AN ORGANIZED HEALTH CARE EDUCATION/TRAINING PROGRAM

## 2024-09-13 ASSESSMENT — PATIENT HEALTH QUESTIONNAIRE - PHQ9
SUM OF ALL RESPONSES TO PHQ QUESTIONS 1-9: 17
10. IF YOU CHECKED OFF ANY PROBLEMS, HOW DIFFICULT HAVE THESE PROBLEMS MADE IT FOR YOU TO DO YOUR WORK, TAKE CARE OF THINGS AT HOME, OR GET ALONG WITH OTHER PEOPLE: NOT DIFFICULT AT ALL
SUM OF ALL RESPONSES TO PHQ QUESTIONS 1-9: 17

## 2024-09-13 ASSESSMENT — PAIN SCALES - GENERAL: PAINLEVEL: NO PAIN (0)

## 2024-09-13 ASSESSMENT — ENCOUNTER SYMPTOMS: NERVOUS/ANXIOUS: 1

## 2024-09-13 NOTE — PATIENT INSTRUCTIONS
Testing ordered for pharmacogenetics. They will send a [employer] message to you to schedule.   Radiology will call to schedule brain MRI.

## 2024-09-13 NOTE — PROGRESS NOTES
Assessment & Plan     ELIZABETH (generalized anxiety disorder)  Improved on celexa but still prominent.   Did not tolerate buspar. Did not like gabapentin in the past.   Consider referral to psychiatry in the future.   Will get pharmacogenetic testing to help determine best next steps.   - Adult Genetics & Metabolism Referral; Future    Moderate episode of recurrent major depressive disorder (H)  Improved on celexa but some symptoms recurring.   Beneficial but still room for improvement - does not want to change yet though.   Has tried zoloft, lexapro, prozac, buspar and gabapentin in the past with minimal benefit or intolerances.   Will plan pharmacogenetic testing. Referral placed.   ?benefit from effexor - await testing.   - Adult Genetics & Metabolism Referral; Future    Mixed obsessional thoughts and acts  - Adult Genetics & Metabolism Referral; Future    Migraine without aura and without status migrainosus, not intractable  History of this but also vision changes intermittent too. ?atypical aura.   Will aura/vision and migraines worsening, reasonable to update brain MRI  Discussed option for prophylactic medicine or triptan, she declines right now. Will consider if worsens.   Reassuringly normal neurological exam today.   Follow up pending MRI or if migraines worsen.   - MR Brain w/o & w Contrast; Future    Tension headache  Cause of daily, dull headaches. Does have muscle tension on exam.   Discussed physical therapy - she will consider.     Vision changes  Eye exam for this in 2022 with [facility] - reviewed and was normal   Since ongoing, should update eye exam again.   Planning brain MRI.   - MR Brain w/o & w Contrast; Future        Follow up three months for physical.     The longitudinal plan of care for the diagnosis(es)/condition(s) as documented were addressed during this visit. Due to the added complexity in care, I will continue to support Hemalatha in the subsequent management and with ongoing continuity of  "care.    Subjective   Hemalatha is a 19 year old, presenting for the following health issues:  Anxiety and Depression        9/13/2024     2:16 PM   Additional Questions   Roomed by Maryse Arshad LPN   Accompanied by sister     History of Present Illness       Reason for visit:  Medication and talk about mri She is missing 1 dose(s) of medications per week.  She is not taking prescribed medications regularly due to remembering to take.       Depression and Anxiety   How are you doing with your depression since your last visit? No change  How are you doing with your anxiety since your last visit?  No change  Are you having other symptoms that might be associated with depression or anxiety? Yes:  anxiety- anxious and worrying   Have you had a significant life event? No   Do you have any concerns with your use of alcohol or other drugs? No    Meds are working but maybe not working as well as they were before.   Patient stopped buspar - made her feel \"wonky\"  Still taking Celexa.   Depression better.   Anxiety still high.     Social History     Tobacco Use    Smoking status: Never    Smokeless tobacco: Never   Vaping Use    Vaping status: Never Used   Substance Use Topics    Alcohol use: Never     Alcohol/week: 0.0 standard drinks of alcohol    Drug use: Never         4/18/2024     2:11 PM 6/18/2024     3:37 PM 9/13/2024     1:46 PM   PHQ   PHQ-9 Total Score 17 16 17   Q9: Thoughts of better off dead/self-harm past 2 weeks Not at all Not at all Not at all         4/4/2024    11:31 AM 4/18/2024     2:11 PM 6/18/2024     3:40 PM   ELIZABETH-7 SCORE   Total Score 19 (severe anxiety) 20 (severe anxiety) 17 (severe anxiety)   Total Score 19 20 17         9/13/2024     1:46 PM   Last PHQ-9   1.  Little interest or pleasure in doing things 2   2.  Feeling down, depressed, or hopeless 2   3.  Trouble falling or staying asleep, or sleeping too much 3   4.  Feeling tired or having little energy 3   5.  Poor appetite or overeating 3   6.  " "Feeling bad about yourself 1   7.  Trouble concentrating 3   8.  Moving slowly or restless 0   Q9: Thoughts of better off dead/self-harm past 2 weeks 0   PHQ-9 Total Score 17       Concern - genetic testing   Onset: couple months ago   Description: more for medication and what works best for her and what doesn't.     Concern - brain MRI   Onset: couple years   Description:   Has been dealing with headaches, blurry vision, and hearing heartbeat for the last couple years wanting to get MRI to rule things out   Intensity: moderate  Progression of Symptoms:  same  Accompanying Signs & Symptoms:  Memory loss- past memories, remembering to do something.   Previous history of similar problem:   Has had some neuro issues   Therapies Tried and outcome: none      Headaches are daily. Goes to [employer], blue light glasses. Does get flashing light in eyes. Faint white strobe at times. Constant shaking on left arm.   Eye exam not too long ago. Went to [facility]. Eyes were reportedly normal.   Headaches for years. Vision stuff two years.   Headaches are frontal or in back. A lot of pressure. Pulsate. Vary between needing to lay down and rest vs push through the day. The bad throbbing ones 3-4 times per month. Vision stuff comes without the headache. Does get nausea with migraine type. No other aura.   No family history of migraines or auras.   Has been a while since she went to the [employer].   Not sure she wants to do any medicine.   Wants to r/o bad things first.         Objective    /66 (BP Location: Left arm, Patient Position: Sitting, Cuff Size: Adult Regular)   Pulse 77   Temp 98  F (36.7  C) (Tympanic)   Ht 1.6 m (5' 2.99\")   Wt 60.1 kg (132 lb 9.6 oz)   LMP  (LMP Unknown)   SpO2 98%   BMI 23.50 kg/m    Body mass index is 23.5 kg/m .    Physical Exam  Constitutional:       General: She is not in acute distress.     Appearance: Normal appearance. She is not ill-appearing, toxic-appearing or diaphoretic.   HENT:      Head: " Normocephalic and atraumatic.      Comments: No temporal artery tenderness.      Right Ear: Tympanic membrane and ear canal normal.      Left Ear: Tympanic membrane and ear canal normal.      Mouth/Throat:      Mouth: Mucous membranes are moist.      Pharynx: Oropharynx is clear.   Eyes:      General: No visual field deficit.     Extraocular Movements: Extraocular movements intact.      Conjunctiva/sclera: Conjunctivae normal.      Pupils: Pupils are equal, round, and reactive to light.   Cardiovascular:      Rate and Rhythm: Normal rate and regular rhythm.      Heart sounds: No murmur heard.  Pulmonary:      Effort: Pulmonary effort is normal.      Breath sounds: Normal breath sounds. No wheezing or rales.   Musculoskeletal:      Cervical back: Full passive range of motion without pain and neck supple. No rigidity or tenderness.      Comments:  muscle tension in trapezius. some tender points, worse on left neck   Skin:     General: Skin is warm and dry.      Capillary Refill: Capillary refill takes less than 2 seconds.      Findings: No rash.   Neurological:      Mental Status: She is alert and oriented to person, place, and time.      Cranial Nerves: Cranial nerves 2-12 are intact. No cranial nerve deficit, dysarthria or facial asymmetry.      Sensory: Sensation is intact. No sensory deficit.      Motor: Motor function is intact. No weakness, tremor, abnormal muscle tone or pronator drift.      Coordination: Coordination is intact. Romberg sign negative. Coordination normal. Finger-Nose-Finger Test and Heel to Shin Test normal. Rapid alternating movements normal.      Gait: Gait is intact.      Deep Tendon Reflexes:      Reflex Scores:       Bicep reflexes are 2+ on the right side and 2+ on the left side.       Patellar reflexes are 2+ on the right side and 2+ on the left side.       Achilles reflexes are 2+ on the right side and 2+ on the left side.     Comments: Able to tandem walk   Psychiatric:         Mood  and Affect: Mood normal.         Behavior: Behavior normal.         Thought Content: Thought content normal.         Judgment: Judgment normal.                Signed Electronically by: Mago Perez MD

## 2024-09-17 ENCOUNTER — TELEPHONE (OUTPATIENT)
Dept: FAMILY MEDICINE | Facility: OTHER | Age: 19
End: 2024-09-17

## 2024-10-16 ENCOUNTER — HOSPITAL ENCOUNTER (OUTPATIENT)
Dept: MRI IMAGING | Facility: HOSPITAL | Age: 19
Discharge: HOME | End: 2024-10-16
Attending: STUDENT IN AN ORGANIZED HEALTH CARE EDUCATION/TRAINING PROGRAM | Admitting: STUDENT IN AN ORGANIZED HEALTH CARE EDUCATION/TRAINING PROGRAM
Payer: COMMERCIAL

## 2024-10-16 DIAGNOSIS — H53.9 VISION CHANGES: ICD-10-CM

## 2024-10-16 DIAGNOSIS — G43.009 MIGRAINE WITHOUT AURA AND WITHOUT STATUS MIGRAINOSUS, NOT INTRACTABLE: ICD-10-CM

## 2024-10-16 PROCEDURE — 70551 MRI BRAIN STEM W/O DYE: CPT

## 2024-11-08 DIAGNOSIS — F41.1 GAD (GENERALIZED ANXIETY DISORDER): Chronic | ICD-10-CM

## 2024-11-08 DIAGNOSIS — F33.1 MODERATE EPISODE OF RECURRENT MAJOR DEPRESSIVE DISORDER (H): ICD-10-CM

## 2024-11-10 ENCOUNTER — MYC REFILL (OUTPATIENT)
Dept: FAMILY MEDICINE | Facility: OTHER | Age: 19
End: 2024-11-10

## 2024-11-10 DIAGNOSIS — F41.1 GAD (GENERALIZED ANXIETY DISORDER): Chronic | ICD-10-CM

## 2024-11-10 DIAGNOSIS — F33.1 MODERATE EPISODE OF RECURRENT MAJOR DEPRESSIVE DISORDER (H): ICD-10-CM

## 2024-11-10 RX ORDER — CITALOPRAM HYDROBROMIDE 40 MG/1
40 TABLET ORAL DAILY
Qty: 30 TABLET | Refills: 3 | Status: CANCELLED | OUTPATIENT
Start: 2024-11-10

## 2024-11-11 ENCOUNTER — MYC MEDICAL ADVICE (OUTPATIENT)
Dept: FAMILY MEDICINE | Facility: OTHER | Age: 19
End: 2024-11-11

## 2024-11-11 RX ORDER — CITALOPRAM HYDROBROMIDE 40 MG/1
40 TABLET ORAL DAILY
Qty: 30 TABLET | Refills: 0 | Status: SHIPPED | OUTPATIENT
Start: 2024-11-11

## 2024-11-11 NOTE — TELEPHONE ENCOUNTER
Celexa      Last Written Prescription Date:  10.7.24  Last Fill Quantity: #30,   # refills: 0  Last Office Visit: 9.13.24  Future Office visit:    Next 5 appointments (look out 90 days)      Dec 23, 2024 8:00 AM  (Arrive by 7:45 AM)  Adult Preventative Visit with Mago Perez MD  [facility] [place] ([facility] [place] ) [address]  [place] [address]  [phone]             Routing refill request to provider for review/approval because:

## 2024-12-11 DIAGNOSIS — F41.1 GAD (GENERALIZED ANXIETY DISORDER): Chronic | ICD-10-CM

## 2024-12-11 DIAGNOSIS — F33.1 MODERATE EPISODE OF RECURRENT MAJOR DEPRESSIVE DISORDER (H): ICD-10-CM

## 2024-12-11 NOTE — TELEPHONE ENCOUNTER
citalopram (CELEXA) 40 MG tablet 30 tablet 0 11/11/2024   Last Office Visit: 9.13.24     Future Office visit:    Next 5 appointments (look out 90 days)      Dec 23, 2024 8:00 AM  (Arrive by 7:45 AM)  Adult Preventative Visit with Mago Perez MD  [facility] [place] ([facility] [place] ) [address]  [place] [address]  [phone]             Routing refill request to provider for review/approval because:

## 2024-12-12 RX ORDER — CITALOPRAM HYDROBROMIDE 40 MG/1
40 TABLET ORAL DAILY
Qty: 30 TABLET | Refills: 0 | Status: SHIPPED | OUTPATIENT
Start: 2024-12-12

## 2025-01-15 DIAGNOSIS — F33.1 MODERATE EPISODE OF RECURRENT MAJOR DEPRESSIVE DISORDER (H): ICD-10-CM

## 2025-01-15 DIAGNOSIS — F41.1 GAD (GENERALIZED ANXIETY DISORDER): Chronic | ICD-10-CM

## 2025-01-15 NOTE — TELEPHONE ENCOUNTER
Richy      Last Written Prescription Date:  12/12/24  Last Fill Quantity: 30,   # refills: 0  Last Office Visit: 9/13/24  Future Office visit:       Routing refill request to provider for review/approval because:

## 2025-01-16 RX ORDER — CITALOPRAM HYDROBROMIDE 40 MG/1
40 TABLET ORAL DAILY
Qty: 30 TABLET | Refills: 0 | Status: SHIPPED | OUTPATIENT
Start: 2025-01-16

## 2025-01-16 NOTE — TELEPHONE ENCOUNTER
Routing refill request to provider for review/approval because:  SSRIs Protocol Failed    Rerun Protocol (1/15/2025 2:48 AM)    PHQ-9 score less than 5 in past 6 months    Please review last PHQ-9 score.     ELIZABETH-7 score of less than 5 in past 6 months.    Please review last ELIZABETH-7 score.           4/18/2024     2:11 PM 6/18/2024     3:37 PM 9/13/2024     1:46 PM   PHQ   PHQ-9 Total Score 17 16 17   Q9: Thoughts of better off dead/self-harm past 2 weeks Not at all Not at all Not at all              4/4/2024    11:31 AM 4/18/2024     2:11 PM 6/18/2024     3:40 PM   ELIZABETH-7 SCORE   Total Score 19 (severe anxiety) 20 (severe anxiety) 17 (severe anxiety)   Total Score 19 20 17

## 2025-01-16 NOTE — TELEPHONE ENCOUNTER
Attempt # 1  Outcome: Left Message   Comment: LVM for patient to call to schedule an overdue follow-up with Dr. Perez.

## 2025-02-04 ENCOUNTER — OFFICE VISIT (OUTPATIENT)
Dept: FAMILY MEDICINE | Facility: OTHER | Age: 20
End: 2025-02-04
Attending: STUDENT IN AN ORGANIZED HEALTH CARE EDUCATION/TRAINING PROGRAM
Payer: COMMERCIAL

## 2025-02-04 VITALS
BODY MASS INDEX: 22.86 KG/M2 | SYSTOLIC BLOOD PRESSURE: 104 MMHG | OXYGEN SATURATION: 99 % | TEMPERATURE: 98.5 F | DIASTOLIC BLOOD PRESSURE: 84 MMHG | HEART RATE: 83 BPM | HEIGHT: 64 IN | WEIGHT: 133.9 LBS

## 2025-02-04 DIAGNOSIS — E80.0 EPP (ERYTHROPOIETIC PROTOPORPHYRIA) (H): ICD-10-CM

## 2025-02-04 DIAGNOSIS — R00.2 PALPITATIONS: ICD-10-CM

## 2025-02-04 DIAGNOSIS — F33.1 MODERATE EPISODE OF RECURRENT MAJOR DEPRESSIVE DISORDER (H): ICD-10-CM

## 2025-02-04 DIAGNOSIS — G43.009 MIGRAINE WITHOUT AURA AND WITHOUT STATUS MIGRAINOSUS, NOT INTRACTABLE: Chronic | ICD-10-CM

## 2025-02-04 DIAGNOSIS — E61.1 IRON DEFICIENCY: ICD-10-CM

## 2025-02-04 DIAGNOSIS — R74.01 TRANSAMINITIS: ICD-10-CM

## 2025-02-04 DIAGNOSIS — G44.209 TENSION HEADACHE: ICD-10-CM

## 2025-02-04 DIAGNOSIS — Z00.00 ROUTINE GENERAL MEDICAL EXAMINATION AT A HEALTH CARE FACILITY: Primary | ICD-10-CM

## 2025-02-04 DIAGNOSIS — R11.0 CHRONIC NAUSEA: ICD-10-CM

## 2025-02-04 DIAGNOSIS — F41.1 GAD (GENERALIZED ANXIETY DISORDER): ICD-10-CM

## 2025-02-04 DIAGNOSIS — K21.9 GASTROESOPHAGEAL REFLUX DISEASE WITHOUT ESOPHAGITIS: ICD-10-CM

## 2025-02-04 LAB
ALBUMIN SERPL BCG-MCNC: 4.8 G/DL (ref 3.5–5.2)
ALP SERPL-CCNC: 73 U/L (ref 40–150)
ALT SERPL W P-5'-P-CCNC: 59 U/L (ref 0–50)
ANION GAP SERPL CALCULATED.3IONS-SCNC: 12 MMOL/L (ref 7–15)
AST SERPL W P-5'-P-CCNC: 50 U/L (ref 0–45)
ATRIAL RATE - MUSE: 79 BPM
BASOPHILS # BLD AUTO: 0 10E3/UL (ref 0–0.2)
BASOPHILS NFR BLD AUTO: 0 %
BILIRUB SERPL-MCNC: 0.4 MG/DL
BUN SERPL-MCNC: 10.9 MG/DL (ref 6–20)
CALCIUM SERPL-MCNC: 9.8 MG/DL (ref 8.8–10.4)
CHLORIDE SERPL-SCNC: 101 MMOL/L (ref 98–107)
CREAT SERPL-MCNC: 0.88 MG/DL (ref 0.51–0.95)
DIASTOLIC BLOOD PRESSURE - MUSE: NORMAL MMHG
EGFRCR SERPLBLD CKD-EPI 2021: >90 ML/MIN/1.73M2
EOSINOPHIL # BLD AUTO: 0.1 10E3/UL (ref 0–0.7)
EOSINOPHIL NFR BLD AUTO: 2 %
ERYTHROCYTE [DISTWIDTH] IN BLOOD BY AUTOMATED COUNT: 15.9 % (ref 10–15)
FERRITIN SERPL-MCNC: 29 NG/ML (ref 6–175)
GLUCOSE SERPL-MCNC: 80 MG/DL (ref 70–99)
HCO3 SERPL-SCNC: 25 MMOL/L (ref 22–29)
HCT VFR BLD AUTO: 40.6 % (ref 35–47)
HGB BLD-MCNC: 13.7 G/DL (ref 11.7–15.7)
IMM GRANULOCYTES # BLD: 0 10E3/UL
IMM GRANULOCYTES NFR BLD: 0 %
INTERPRETATION ECG - MUSE: NORMAL
IRON BINDING CAPACITY (ROCHE): 347 UG/DL (ref 240–430)
IRON SATN MFR SERPL: 15 % (ref 15–46)
IRON SERPL-MCNC: 51 UG/DL (ref 37–145)
LYMPHOCYTES # BLD AUTO: 2.7 10E3/UL (ref 0.8–5.3)
LYMPHOCYTES NFR BLD AUTO: 36 %
MAGNESIUM SERPL-MCNC: 2.1 MG/DL (ref 1.7–2.3)
MCH RBC QN AUTO: 25.7 PG (ref 26.5–33)
MCHC RBC AUTO-ENTMCNC: 33.7 G/DL (ref 31.5–36.5)
MCV RBC AUTO: 76 FL (ref 78–100)
MONOCYTES # BLD AUTO: 0.5 10E3/UL (ref 0–1.3)
MONOCYTES NFR BLD AUTO: 6 %
NEUTROPHILS # BLD AUTO: 4.3 10E3/UL (ref 1.6–8.3)
NEUTROPHILS NFR BLD AUTO: 56 %
NRBC # BLD AUTO: 0 10E3/UL
NRBC BLD AUTO-RTO: 0 /100
P AXIS - MUSE: 22 DEGREES
PLATELET # BLD AUTO: 169 10E3/UL (ref 150–450)
POTASSIUM SERPL-SCNC: 3.7 MMOL/L (ref 3.4–5.3)
PR INTERVAL - MUSE: 138 MS
PROT SERPL-MCNC: 8.1 G/DL (ref 6.4–8.3)
QRS DURATION - MUSE: 78 MS
QT - MUSE: 396 MS
QTC - MUSE: 454 MS
R AXIS - MUSE: 24 DEGREES
RBC # BLD AUTO: 5.34 10E6/UL (ref 3.8–5.2)
SODIUM SERPL-SCNC: 138 MMOL/L (ref 135–145)
SYSTOLIC BLOOD PRESSURE - MUSE: NORMAL MMHG
T AXIS - MUSE: 31 DEGREES
TSH SERPL DL<=0.005 MIU/L-ACNC: 2.51 UIU/ML (ref 0.3–4.2)
VENTRICULAR RATE- MUSE: 79 BPM
WBC # BLD AUTO: 7.6 10E3/UL (ref 4–11)

## 2025-02-04 PROCEDURE — 93005 ELECTROCARDIOGRAM TRACING: CPT | Performed by: STUDENT IN AN ORGANIZED HEALTH CARE EDUCATION/TRAINING PROGRAM

## 2025-02-04 PROCEDURE — 80053 COMPREHEN METABOLIC PANEL: CPT | Mod: ZL | Performed by: STUDENT IN AN ORGANIZED HEALTH CARE EDUCATION/TRAINING PROGRAM

## 2025-02-04 PROCEDURE — 83735 ASSAY OF MAGNESIUM: CPT | Mod: ZL | Performed by: STUDENT IN AN ORGANIZED HEALTH CARE EDUCATION/TRAINING PROGRAM

## 2025-02-04 PROCEDURE — 85004 AUTOMATED DIFF WBC COUNT: CPT | Mod: ZL | Performed by: STUDENT IN AN ORGANIZED HEALTH CARE EDUCATION/TRAINING PROGRAM

## 2025-02-04 PROCEDURE — 85048 AUTOMATED LEUKOCYTE COUNT: CPT | Mod: ZL | Performed by: STUDENT IN AN ORGANIZED HEALTH CARE EDUCATION/TRAINING PROGRAM

## 2025-02-04 PROCEDURE — 83540 ASSAY OF IRON: CPT | Mod: ZL | Performed by: STUDENT IN AN ORGANIZED HEALTH CARE EDUCATION/TRAINING PROGRAM

## 2025-02-04 PROCEDURE — 36415 COLL VENOUS BLD VENIPUNCTURE: CPT | Mod: ZL | Performed by: STUDENT IN AN ORGANIZED HEALTH CARE EDUCATION/TRAINING PROGRAM

## 2025-02-04 PROCEDURE — 83550 IRON BINDING TEST: CPT | Mod: ZL | Performed by: STUDENT IN AN ORGANIZED HEALTH CARE EDUCATION/TRAINING PROGRAM

## 2025-02-04 PROCEDURE — 86364 TISS TRNSGLTMNASE EA IG CLAS: CPT | Mod: ZL | Performed by: STUDENT IN AN ORGANIZED HEALTH CARE EDUCATION/TRAINING PROGRAM

## 2025-02-04 PROCEDURE — 82728 ASSAY OF FERRITIN: CPT | Mod: ZL | Performed by: STUDENT IN AN ORGANIZED HEALTH CARE EDUCATION/TRAINING PROGRAM

## 2025-02-04 PROCEDURE — G0463 HOSPITAL OUTPT CLINIC VISIT: HCPCS

## 2025-02-04 PROCEDURE — 84443 ASSAY THYROID STIM HORMONE: CPT | Mod: ZL | Performed by: STUDENT IN AN ORGANIZED HEALTH CARE EDUCATION/TRAINING PROGRAM

## 2025-02-04 RX ORDER — OMEPRAZOLE 20 MG/1
20 CAPSULE, DELAYED RELEASE ORAL DAILY
Qty: 30 CAPSULE | Refills: 1 | Status: SHIPPED | OUTPATIENT
Start: 2025-02-04

## 2025-02-04 SDOH — HEALTH STABILITY: PHYSICAL HEALTH: ON AVERAGE, HOW MANY DAYS PER WEEK DO YOU ENGAGE IN MODERATE TO STRENUOUS EXERCISE (LIKE A BRISK WALK)?: 0 DAYS

## 2025-02-04 SDOH — HEALTH STABILITY: PHYSICAL HEALTH: ON AVERAGE, HOW MANY MINUTES DO YOU ENGAGE IN EXERCISE AT THIS LEVEL?: 0 MIN

## 2025-02-04 ASSESSMENT — ANXIETY QUESTIONNAIRES
2. NOT BEING ABLE TO STOP OR CONTROL WORRYING: NEARLY EVERY DAY
1. FEELING NERVOUS, ANXIOUS, OR ON EDGE: NEARLY EVERY DAY
5. BEING SO RESTLESS THAT IT IS HARD TO SIT STILL: NOT AT ALL
GAD7 TOTAL SCORE: 13
GAD7 TOTAL SCORE: 13
3. WORRYING TOO MUCH ABOUT DIFFERENT THINGS: NEARLY EVERY DAY
IF YOU CHECKED OFF ANY PROBLEMS ON THIS QUESTIONNAIRE, HOW DIFFICULT HAVE THESE PROBLEMS MADE IT FOR YOU TO DO YOUR WORK, TAKE CARE OF THINGS AT HOME, OR GET ALONG WITH OTHER PEOPLE: NOT DIFFICULT AT ALL
GAD7 TOTAL SCORE: 13
7. FEELING AFRAID AS IF SOMETHING AWFUL MIGHT HAPPEN: MORE THAN HALF THE DAYS
6. BECOMING EASILY ANNOYED OR IRRITABLE: SEVERAL DAYS
8. IF YOU CHECKED OFF ANY PROBLEMS, HOW DIFFICULT HAVE THESE MADE IT FOR YOU TO DO YOUR WORK, TAKE CARE OF THINGS AT HOME, OR GET ALONG WITH OTHER PEOPLE?: NOT DIFFICULT AT ALL
7. FEELING AFRAID AS IF SOMETHING AWFUL MIGHT HAPPEN: MORE THAN HALF THE DAYS
4. TROUBLE RELAXING: SEVERAL DAYS

## 2025-02-04 ASSESSMENT — PATIENT HEALTH QUESTIONNAIRE - PHQ9
SUM OF ALL RESPONSES TO PHQ QUESTIONS 1-9: 14
SUM OF ALL RESPONSES TO PHQ QUESTIONS 1-9: 14
10. IF YOU CHECKED OFF ANY PROBLEMS, HOW DIFFICULT HAVE THESE PROBLEMS MADE IT FOR YOU TO DO YOUR WORK, TAKE CARE OF THINGS AT HOME, OR GET ALONG WITH OTHER PEOPLE: NOT DIFFICULT AT ALL

## 2025-02-04 ASSESSMENT — PAIN SCALES - GENERAL: PAINLEVEL_OUTOF10: MODERATE PAIN (5)

## 2025-02-04 ASSESSMENT — SOCIAL DETERMINANTS OF HEALTH (SDOH): HOW OFTEN DO YOU GET TOGETHER WITH FRIENDS OR RELATIVES?: NEVER

## 2025-02-04 NOTE — PROGRESS NOTES
Preventive Care Visit  [facility]  Mago Perez MD, Family Medicine  Feb 4, 2025      Assessment & Plan     Routine general medical examination at a health care facility  Healthcare maintenance:   - PAP: due, first pap - on period   - Birth control: none   - Menses: irregular, heavy   - Hemoglobin: due  - Glucose screen: due  - Lipid screen: consider, not fasting today   - declines STD testing, one partner.   - declines HPV shot     ELIZABETH (generalized anxiety disorder)  Moderate episode of recurrent major depressive disorder (H)  Stable. Will continue celexa.     Iron deficiency  Improved but still low. Needs to resume taking oral iron.     Chronic nausea  Most likely related to GERD - better in past with omeprazole.   Basic labs overall reassuring. S/p danyell. Will get TTG   Start omeprazole 20mg. If not improving at one month, consider EGD.   - CBC with Platelets & Differential; Future  - Comprehensive metabolic panel; Future  - Tissue transglutaminase deny IgA and IgG; Future  - CBC with Platelets & Differential  - Comprehensive metabolic panel  - Tissue transglutaminase deny IgA and IgG    Palpitations  Ongoing years but worse now. Random. Worse with prolonged standing. ?dehydration vs low iron playing a role. EKG normal. Labs normal. Will get Zio. Encouraged to hydrate and start her iron again. Concerning signs and symptoms reviewed that would indicate need for urgent re-evaluation. Patient stated understanding and agreement with the plan. Follow up one month.   - TSH with free T4 reflex; Future  - Magnesium; Future  - Iron and iron binding capacity; Future  - Ferritin; Future  - EKG 12-lead complete w/read - (Clinic Performed)  - ZIO PATCH MAIL OUT; Future  - TSH with free T4 reflex  - Magnesium  - Iron and iron binding capacity  - Ferritin    Gastroesophageal reflux disease without esophagitis  Known GERD - restarting omeprazole as above.   - CBC with Platelets & Differential; Future  -  Comprehensive metabolic panel; Future  - omeprazole (PRILOSEC) 20 MG DR capsule; Take 1 capsule (20 mg) by mouth daily.  - CBC with Platelets & Differential  - Comprehensive metabolic panel    EPP (erythropoietic protoporphyria) (H)  Needs to establish with adult provider. Referral to [facility]. Does have intermittent transaminitis likely related.   - Adult Oncology/Hematology  Referral; Future    Migraine without aura and without status migrainosus, not intractable  Tension headache  Tension headaches lead to occasional migraines. Recent MR brain normal. Discussed physical therapy, she declines right now. Could consider ppx medication daily in the future. Will discuss more at follow up.     Transaminitis  Intermittent, very mild. Likely related to EPP. Will consider liver ultrasound if worsening. Recheck at follow up.         Counseling  Appropriate preventive services were addressed with this patient via screening, questionnaire, or discussion as appropriate for fall prevention, nutrition, physical activity, Tobacco-use cessation, social engagement, weight loss and cognition.  Checklist reviewing preventive services available has been given to the patient.  Reviewed patient's diet, addressing concerns and/or questions.   Patient is at risk for social isolation and has been provided with information about the benefit of social connection.   The patient was instructed to see the dentist every 6 months.   She is at risk for psychosocial distress and has been provided with information to reduce risk.   The patient's PHQ-9 score is consistent with moderate depression. She was provided with information regarding depression.         Return in about 1 year (around 2/5/2026) for Preventive Visit.      The longitudinal plan of care for the diagnosis(es)/condition(s) as documented were addressed during this visit. Due to the added complexity in care, I will continue to support Hemalatha in the subsequent management and  with ongoing continuity of care.    Tanvir Penny is a 19 year old, presenting for the following:  Physical        2/4/2025     1:49 PM   Additional Questions   Roomed by Maryse JAUREGUI   Accompanied by mother          HPI  Healthcare maintenance:   - PAP: due, first pap - on period   - Birth control: none   - Menses: irregular, heavy   - Hemoglobin: due  - Glucose screen: due  - Lipid screen: consider, not fasting today   - declines STD testing, one partner.   - declines HPV shot       Depression and Anxiety   How are you doing with your depression since your last visit? Improved better   How are you doing with your anxiety since your last visit?  No change  Are you having other symptoms that might be associated with depression or anxiety? No  Have you had a significant life event? No   Do you have any concerns with your use of alcohol or other drugs? No    Mood stable. Continues on celexa. Will still get some panic symptoms.     Social History     Tobacco Use    Smoking status: Never    Smokeless tobacco: Never   Vaping Use    Vaping status: Never Used   Substance Use Topics    Alcohol use: Never    Drug use: Never         6/18/2024     3:37 PM 9/13/2024     1:46 PM 2/4/2025     1:35 PM   PHQ   PHQ-9 Total Score 16 17 14    Q9: Thoughts of better off dead/self-harm past 2 weeks Not at all Not at all Not at all       Patient-reported         4/18/2024     2:11 PM 6/18/2024     3:40 PM 2/4/2025     1:39 PM   ELIZABETH-7 SCORE   Total Score 20 (severe anxiety) 17 (severe anxiety) 13 (moderate anxiety)   Total Score 20 17 13        Patient-reported         2/4/2025     1:35 PM   Last PHQ-9   1.  Little interest or pleasure in doing things 1   2.  Feeling down, depressed, or hopeless 2   3.  Trouble falling or staying asleep, or sleeping too much 3   4.  Feeling tired or having little energy 3   5.  Poor appetite or overeating 2   6.  Feeling bad about yourself 1   7.  Trouble concentrating 2   8.  Moving slowly or restless  0   Q9: Thoughts of better off dead/self-harm past 2 weeks 0   PHQ-9 Total Score 14        Patient-reported         2/4/2025     1:39 PM   ELIZABETH-7    1. Feeling nervous, anxious, or on edge 3   2. Not being able to stop or control worrying 3   3. Worrying too much about different things 3   4. Trouble relaxing 1   5. Being so restless that it is hard to sit still 0   6. Becoming easily annoyed or irritable 1   7. Feeling afraid, as if something awful might happen 2   ELIZABETH-7 Total Score 13    If you checked any problems, how difficult have they made it for you to do your work, take care of things at home, or get along with other people? Not difficult at all       Patient-reported     }  Concern - heart palpitations   Onset: has always had- more worse within last couple months since increasing medication does.   Description: having heart palpitations when standing up for long periods of time or when suddenly standing up it spikes.   Intensity: moderate  Progression of Symptoms:  same  Accompanying Signs & Symptoms: dizzy. Lightheaded, vision changes, shaky. Also having migraines that are causing vision changes as well.   Previous history of similar problem: yes   Precipitating factors:        Worsened by: moving around it spikes more.   Alleviating factors:        Improved by: sitting   Therapies tried and outcome: none     Apply watch will show increased heart rate   Worsening in last few months.   Not taking iron right now     Concern - nausea   Onset: always had but has been worse this month   Description:   Whatever she eats gets severely nauseas after.   Intensity: severe  Progression of Symptoms:  same  Accompanying Signs & Symptoms:  Bloated sometimes gets really itchy   Previous history of similar problem:   Yes- hiatal hernia does run in the family as well   Precipitating factors:   Worsened by: none  Alleviating factors:  Improved by: tums, medications for acid reflux     Therapies Tried and outcome: tums,  medications for acid reflux     Chronic nausea. Worse with eating. No abdominal pain.   Stools daily. No constipation.   Not always with migraines.   Ran out of omeprazole.   Does have a lot of stomach acid.   Worse nausea since she hasn't had it   Weight stable     Migraine   Since your last clinic visit, how have your headaches changed?  No change  How often are you getting headaches or migraines? Everyday    Are you able to do normal daily activities when you have a migraine? Yes  Are you taking rescue/relief medications? (Select all that apply) ibuprofen (Advil, Motrin)  How helpful is your rescue/relief medication?  I get some relief- depends how bad it is   Are you taking any medications to prevent migraines? (Select all that apply)  No  In the past 4 weeks, how often have you gone to urgent care or the emergency room because of your headaches?  0   Patient stated that she also has visual changes when she gets a migraine.   Headaches near daily, not migraines.    Migraine with aura once every two weeks.   Goes to chiro  A lot of scalp tension     Health Care Directive  Patient does not have a Health Care Directive: Discussed advance care planning with patient; however, patient declined at this time.      2/4/2025   General Health   How would you rate your overall physical health? (!) POOR   Feel stress (tense, anxious, or unable to sleep) Very much   (!) STRESS CONCERN      2/4/2025   Nutrition   Three or more servings of calcium each day? Yes   Diet: Gluten-free/reduced   How many servings of fruit and vegetables per day? (!) 0-1   How many sweetened beverages each day? 0-1         2/4/2025   Exercise   Days per week of moderate/strenous exercise 0 days   Average minutes spent exercising at this level 0 min   (!) EXERCISE CONCERN      2/4/2025   Social Factors   Frequency of gathering with friends or relatives Never   Worry food won't last until get money to buy more No   Food not last or not have enough  "money for food? Yes   Do you have housing? (Housing is defined as stable permanent housing and does not include staying ouside in a car, in a tent, in an abandoned building, in an overnight shelter, or couch-surfing.) Yes   Are you worried about losing your housing? No   Lack of transportation? Yes   Unable to get utilities (heat,electricity)? No   (!) FOOD SECURITY CONCERN PRESENT (!) TRANSPORTATION CONCERN PRESENT(!) SOCIAL CONNECTIONS CONCERN      2/4/2025   Dental   Dentist two times every year? (!) NO          Today's PHQ-9 Score:       2/4/2025     1:35 PM   PHQ-9 SCORE   PHQ-9 Total Score MyChart 14 (Moderate depression)   PHQ-9 Total Score 14        Patient-reported         2/4/2025   Substance Use   Alcohol more than 3/day or more than 7/wk No   Do you use any other substances recreationally? No     Social History     Tobacco Use    Smoking status: Never    Smokeless tobacco: Never   Vaping Use    Vaping status: Never Used   Substance Use Topics    Alcohol use: Never    Drug use: Never             2/4/2025   One time HIV Screening   Previous HIV test? No         2/4/2025   STI Screening   New sexual partner(s) since last STI/HIV test? No     History of abnormal Pap smear: No - under age 21, PAP not appropriate for age             2/4/2025   Contraception/Family Planning   Questions about contraception or family planning No     Reviewed and updated as needed this visit by Provider   Tobacco     Med Hx  Surg Hx  Fam Hx  Soc Hx Sexual Activity                 Objective    Exam  /84 (BP Location: Left arm, Patient Position: Sitting, Cuff Size: Adult Regular)   Pulse 83   Temp 98.5  F (36.9  C) (Tympanic)   Ht 1.617 m (5' 3.66\")   Wt 60.7 kg (133 lb 14.4 oz)   LMP 02/01/2025 (Approximate)   SpO2 99%   BMI 23.23 kg/m     Estimated body mass index is 23.23 kg/m  as calculated from the following:    Height as of this encounter: 1.617 m (5' 3.66\").    Weight as of this encounter: 60.7 kg (133 lb " 14.4 oz).    Physical Exam  Constitutional:       General: She is not in acute distress.     Appearance: Normal appearance. She is well-developed. She is not ill-appearing.   HENT:      Head: Normocephalic and atraumatic.      Right Ear: Tympanic membrane and external ear normal.      Left Ear: Tympanic membrane and external ear normal.      Nose: Nose normal.      Mouth/Throat:      Mouth: Mucous membranes are moist.      Pharynx: No oropharyngeal exudate.   Eyes:      Extraocular Movements: Extraocular movements intact.      Conjunctiva/sclera: Conjunctivae normal.   Neck:      Thyroid: No thyroid mass, thyromegaly or thyroid tenderness.   Cardiovascular:      Rate and Rhythm: Normal rate and regular rhythm.      Pulses: Normal pulses.      Heart sounds: Normal heart sounds, S1 normal and S2 normal. No murmur heard.  Pulmonary:      Effort: Pulmonary effort is normal. No respiratory distress.      Breath sounds: Normal breath sounds. No wheezing, rhonchi or rales.   Abdominal:      General: Bowel sounds are normal. There is no abdominal bruit.      Palpations: Abdomen is soft. There is no mass or pulsatile mass.      Tenderness: There is no abdominal tenderness.   Musculoskeletal:         General: Normal range of motion.      Cervical back: Neck supple.      Right lower leg: No edema.      Left lower leg: No edema.   Lymphadenopathy:      Cervical: No cervical adenopathy.   Skin:     General: Skin is warm and dry.      Capillary Refill: Capillary refill takes less than 2 seconds.      Findings: No rash.   Neurological:      Mental Status: She is alert and oriented to person, place, and time.      Gait: Gait is intact.   Psychiatric:         Attention and Perception: Attention normal.         Mood and Affect: Mood normal.         Speech: Speech normal.         Behavior: Behavior normal.         Thought Content: Thought content normal.         Cognition and Memory: Cognition normal.         Judgment: Judgment  normal.           EKG: normal sinus rhythm, no concerning findings, Qtc 454    Results for orders placed or performed in visit on 02/04/25   Comprehensive metabolic panel     Status: Abnormal   Result Value Ref Range    Sodium 138 135 - 145 mmol/L    Potassium 3.7 3.4 - 5.3 mmol/L    Carbon Dioxide (CO2) 25 22 - 29 mmol/L    Anion Gap 12 7 - 15 mmol/L    Urea Nitrogen 10.9 6.0 - 20.0 mg/dL    Creatinine 0.88 0.51 - 0.95 mg/dL    GFR Estimate >90 >60 mL/min/1.73m2    Calcium 9.8 8.8 - 10.4 mg/dL    Chloride 101 98 - 107 mmol/L    Glucose 80 70 - 99 mg/dL    Alkaline Phosphatase 73 40 - 150 U/L    AST 50 (H) 0 - 45 U/L    ALT 59 (H) 0 - 50 U/L    Protein Total 8.1 6.4 - 8.3 g/dL    Albumin 4.8 3.5 - 5.2 g/dL    Bilirubin Total 0.4 <=1.2 mg/dL   TSH with free T4 reflex     Status: Normal   Result Value Ref Range    TSH 2.51 0.30 - 4.20 uIU/mL   Magnesium     Status: Normal   Result Value Ref Range    Magnesium 2.1 1.7 - 2.3 mg/dL   Iron and iron binding capacity     Status: Normal   Result Value Ref Range    Iron 51 37 - 145 ug/dL    Iron Binding Capacity 347 240 - 430 ug/dL    Iron Sat Index 15 15 - 46 %   Ferritin     Status: Normal   Result Value Ref Range    Ferritin 29 6 - 175 ng/mL   CBC with platelets and differential     Status: Abnormal   Result Value Ref Range    WBC Count 7.6 4.0 - 11.0 10e3/uL    RBC Count 5.34 (H) 3.80 - 5.20 10e6/uL    Hemoglobin 13.7 11.7 - 15.7 g/dL    Hematocrit 40.6 35.0 - 47.0 %    MCV 76 (L) 78 - 100 fL    MCH 25.7 (L) 26.5 - 33.0 pg    MCHC 33.7 31.5 - 36.5 g/dL    RDW 15.9 (H) 10.0 - 15.0 %    Platelet Count 169 150 - 450 10e3/uL    % Neutrophils 56 %    % Lymphocytes 36 %    % Monocytes 6 %    % Eosinophils 2 %    % Basophils 0 %    % Immature Granulocytes 0 %    NRBCs per 100 WBC 0 <1 /100    Absolute Neutrophils 4.3 1.6 - 8.3 10e3/uL    Absolute Lymphocytes 2.7 0.8 - 5.3 10e3/uL    Absolute Monocytes 0.5 0.0 - 1.3 10e3/uL    Absolute Eosinophils 0.1 0.0 - 0.7 10e3/uL     Absolute Basophils 0.0 0.0 - 0.2 10e3/uL    Absolute Immature Granulocytes 0.0 <=0.4 10e3/uL    Absolute NRBCs 0.0 10e3/uL   EKG 12-lead complete w/read - (Clinic Performed)     Status: None   Result Value Ref Range    Systolic Blood Pressure  mmHg    Diastolic Blood Pressure  mmHg    Ventricular Rate 79 BPM    Atrial Rate 79 BPM    AZ Interval 138 ms    QRS Duration 78 ms     ms    QTc 454 ms    P Axis 22 degrees    R AXIS 24 degrees    T Axis 31 degrees    Interpretation ECG       Sinus rhythm  Normal ECG  No previous ECGs available  Confirmed by MD Lexy, Daniel ([id number]) on 2/4/2025 4:22:01 PM     CBC with Platelets & Differential     Status: Abnormal    Narrative    The following orders were created for panel order CBC with Platelets & Differential.  Procedure                               Abnormality         Status                     ---------                               -----------         ------                     CBC with platelets and d...[[id number]]  Abnormal            Final result               RBC and Platelet Morphology[[id number]]                                                   Please view results for these tests on the individual orders.         Signed Electronically by: Mago Perez MD    Answers submitted by the patient for this visit:  Patient Health Questionnaire (Submitted on 2/4/2025)  If you checked off any problems, how difficult have these problems made it for you to do your work, take care of things at home, or get along with other people?: Not difficult at all  PHQ9 TOTAL SCORE: 14  Patient Health Questionnaire (G7) (Submitted on 2/4/2025)  ELIZABETH 7 TOTAL SCORE: 13

## 2025-02-04 NOTE — PATIENT INSTRUCTIONS
Patient Education   Preventive Care Advice   This is general advice given by our system to help you stay healthy. However, your care team may have specific advice just for you. Please talk to your care team about your preventive care needs.  Nutrition  Eat 5 or more servings of fruits and vegetables each day.  Try wheat bread, brown rice and whole grain pasta (instead of white bread, rice, and pasta).  Get enough calcium and vitamin D. Check the label on foods and aim for 100% of the RDA (recommended daily allowance).  Lifestyle  Exercise at least 150 minutes each week  (30 minutes a day, 5 days a week).  Do muscle strengthening activities 2 days a week. These help control your weight and prevent disease.  No smoking.  Wear sunscreen to prevent skin cancer.  Have a dental exam and cleaning every 6 months.  Yearly exams  See your health care team every year to talk about:  Any changes in your health.  Any medicines your care team has prescribed.  Preventive care, family planning, and ways to prevent chronic diseases.  Shots (vaccines)   HPV shots (up to age 26), if you've never had them before.  Hepatitis B shots (up to age 59), if you've never had them before.  COVID-19 shot: Get this shot when it's due.  Flu shot: Get a flu shot every year.  Tetanus shot: Get a tetanus shot every 10 years.  Pneumococcal, hepatitis A, and RSV shots: Ask your care team if you need these based on your risk.  Shingles shot (for age 50 and up)  General health tests  Diabetes screening:  Starting at age 35, Get screened for diabetes at least every 3 years.  If you are younger than age 35, ask your care team if you should be screened for diabetes.  Cholesterol test: At age 39, start having a cholesterol test every 5 years, or more often if advised.  Bone density scan (DEXA): At age 50, ask your care team if you should have this scan for osteoporosis (brittle bones).  Hepatitis C: Get tested at least once in your life.  STIs (sexually  transmitted infections)  Before age 24: Ask your care team if you should be screened for STIs.  After age 24: Get screened for STIs if you're at risk. You are at risk for STIs (including HIV) if:  You are sexually active with more than one person.  You don't use condoms every time.  You or a partner was diagnosed with a sexually transmitted infection.  If you are at risk for HIV, ask about PrEP medicine to prevent HIV.  Get tested for HIV at least once in your life, whether you are at risk for HIV or not.  Cancer screening tests  Cervical cancer screening: If you have a cervix, begin getting regular cervical cancer screening tests starting at age 21.  Breast cancer scan (mammogram): If you've ever had breasts, begin having regular mammograms starting at age 40. This is a scan to check for breast cancer.  Colon cancer screening: It is important to start screening for colon cancer at age 45.  Have a colonoscopy test every 10 years (or more often if you're at risk) Or, ask your provider about stool tests like a FIT test every year or Cologuard test every 3 years.  To learn more about your testing options, visit:   .  For help making a decision, visit:   https://bit.ly/mh67858.  Prostate cancer screening test: If you have a prostate, ask your care team if a prostate cancer screening test (PSA) at age 55 is right for you.  Lung cancer screening: If you are a current or former smoker ages 50 to 80, ask your care team if ongoing lung cancer screenings are right for you.  For informational purposes only. Not to replace the advice of your health care provider. Copyright   2023 [facility]. All rights reserved. Clinically reviewed by the [facility] Transitions Program. [employer] [id number] - REV 01/24.  Learning About Stress  What is stress?     Stress is your body's response to a hard situation. Your body can have a physical, emotional, or mental response. Stress is a fact of life for most people, and it  affects everyone differently. What causes stress for you may not be stressful for someone else.  A lot of things can cause stress. You may feel stress when you go on a job interview, take a test, or run a race. This kind of short-term stress is normal and even useful. It can help you if you need to work hard or react quickly. For example, stress can help you finish an important job on time.  Long-term stress is caused by ongoing stressful situations or events. Examples of long-term stress include long-term health problems, ongoing problems at work, or conflicts in your family. Long-term stress can harm your health.  How does stress affect your health?  When you are stressed, your body responds as though you are in danger. It makes hormones that speed up your heart, make you breathe faster, and give you a burst of energy. This is called the fight-or-flight stress response. If the stress is over quickly, your body goes back to normal and no harm is done.  But if stress happens too often or lasts too long, it can have bad effects. Long-term stress can make you more likely to get sick, and it can make symptoms of some diseases worse. If you tense up when you are stressed, you may develop neck, shoulder, or low back pain. Stress is linked to high blood pressure and heart disease.  Stress also harms your emotional health. It can make you wiley, tense, or depressed. Your relationships may suffer, and you may not do well at work or school.  What can you do to manage stress?  You can try these things to help manage stress:   Do something active. Exercise or activity can help reduce stress. Walking is a great way to get started. Even everyday activities such as housecleaning or yard work can help.  Try yoga or brenda chi. These techniques combine exercise and meditation. You may need some training at first to learn them.  Do something you enjoy. For example, listen to music or go to a movie. Practice your hobby or do volunteer  "work.  Meditate. This can help you relax, because you are not worrying about what happened before or what may happen in the future.  Do guided imagery. Imagine yourself in any setting that helps you feel calm. You can use online videos, books, or a teacher to guide you.  Do breathing exercises. For example:  From a standing position, bend forward from the waist with your knees slightly bent. Let your arms dangle close to the floor.  Breathe in slowly and deeply as you return to a standing position. Roll up slowly and lift your head last.  Hold your breath for just a few seconds in the standing position.  Breathe out slowly and bend forward from the waist.  Let your feelings out. Talk, laugh, cry, and express anger when you need to. Talking with supportive friends or family, a counselor, or a hugo leader about your feelings is a healthy way to relieve stress. Avoid discussing your feelings with people who make you feel worse.  Write. It may help to write about things that are bothering you. This helps you find out how much stress you feel and what is causing it. When you know this, you can find better ways to cope.  What can you do to prevent stress?  You might try some of these things to help prevent stress:  Manage your time. This helps you find time to do the things you want and need to do.  Get enough sleep. Your body recovers from the stresses of the day while you are sleeping.  Get support. Your family, friends, and community can make a difference in how you experience stress.  Limit your news feed. Avoid or limit time on social media or news that may make you feel stressed.  Do something active. Exercise or activity can help reduce stress. Walking is a great way to get started.  Where can you learn more?  Go to https://www.[employer].net/patiented  Enter N032 in the search box to learn more about \"Learning About Stress.\"  Current as of: October 24, 2023  Content Version: 14.3    2024 Apruve. "   Care instructions adapted under license by your healthcare professional. If you have questions about a medical condition or this instruction, always ask your healthcare professional. [employer] disclaims any warranty or liability for your use of this information.    Learning About Depression Screening  What is depression screening?  Depression screening is a way to see if you have depression symptoms. It may be done by a doctor or counselor. It's often part of a routine checkup. That's because your mental health is just as important as your physical health.  Depression is a mental health condition that affects how you feel, think, and act. You may:  Have less energy.  Lose interest in your daily activities.  Feel sad and grouchy for a long time.  Depression is very common. It affects people of all ages.  Many things can lead to depression. Some people become depressed after they have a stroke or find out they have a major illness like cancer or heart disease. The death of a loved one or a breakup may lead to depression. It can run in families. Most experts believe that a combination of inherited genes and stressful life events can cause it.  What happens during screening?  You may be asked to fill out a form about your depression symptoms. You and the doctor will discuss your answers. The doctor may ask you more questions to learn more about how you think, act, and feel.  What happens after screening?  If you have symptoms of depression, your doctor will talk to you about your options.  Doctors usually treat depression with medicines or counseling. Often, combining the two works best. Many people don't get help because they think that they'll get over the depression on their own. But people with depression may not get better unless they get treatment.  The cause of depression is not well understood. There may be many factors involved. But if you have depression, it's not your fault.  A serious symptom  "of depression is thinking about death or suicide. If you or someone you care about talks about this or about feeling hopeless, get help right away.  It's important to know that depression can be treated. Medicine, counseling, and self-care may help.  Where can you learn more?  Go to https://www.[employer].net/patiented  Enter T185 in the search box to learn more about \"Learning About Depression Screening.\"  Current as of: July 31, 2024  Content Version: 14.3    2024 [email or web address].   Care instructions adapted under license by your healthcare professional. If you have questions about a medical condition or this instruction, always ask your healthcare professional. [email or web address] disclaims any warranty or liability for your use of this information.       "

## 2025-02-05 ENCOUNTER — TRANSCRIBE ORDERS (OUTPATIENT)
Dept: OTHER | Age: 20
End: 2025-02-05

## 2025-02-05 ENCOUNTER — PATIENT OUTREACH (OUTPATIENT)
Dept: ONCOLOGY | Facility: CLINIC | Age: 20
End: 2025-02-05

## 2025-02-05 DIAGNOSIS — E80.0 EPP (ERYTHROPOIETIC PROTOPORPHYRIA) (H): Primary | ICD-10-CM

## 2025-02-05 NOTE — PROGRESS NOTES
New Patient Oncology Nurse Navigator Note     Referral Received: 02/05/25      Referring provider: Mago Perez MD     Referring Clinic/Organization: [facility]     Referred to: Benign Hematology    Requested provider (if applicable): Dr. 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 The family history is consistent with these results. It causes skin sensitivity and pigment stones but while some porphyrias frequently lead to hepatic pathology, EPP is much less likely to do so. She seems to have some tender borderline hepatomegaly but her pain seems to be more sporadic with location variability to comfortably say this is due to hepatomegaly. It also seems like her weight loss has been pretty significant along with anorexia. I have put in a consult for GI to evaluate this issue. I will try gabapentin for the pain as there are a few reports discussing using neuropathic pain medications in these scenarios. There is a new drug called afamelanotide, which is an implantable melanocyte stimulating hormone analog to help with EPP symptoms, though its best benefit is probably for skin sensitivity. It is only approved in adults however, and I do not know of anyone locally who has implanted one, so we left it at the discussion. She is iron deficient, as can be seen in EPP, so iron supplementation was also prescribed.        Recommendations/Plan:  1) Labs: none today  2) Medication Changes: off gabapentin. Still using iron. Will  try OTC H1 antagonists (second gen)  3) Other orders/recommendations: GI referral to help with pain/weight loss workup and possible endoscopy sometime in the future.  4) Follow up plan: Follow up in ~4 weeks by phone or in person depending on restrictions.     Records Location: [facility]     Additional testing needed prior to consult:     ?    Referral updates and Plan:     02/05/2025 12:07 PM -  Referral received and reviewed. Sent to NPS to schedule next available with Dr. Logan.     Jessica Henning, PHILIPN, RN  Hematology/Oncology Nurse Navigator  [facility]  [phone] / [phone]

## 2025-02-06 LAB
TTG IGA SER-ACNC: 0.9 U/ML
TTG IGG SER-ACNC: 2 U/ML

## 2025-02-17 NOTE — TELEPHONE ENCOUNTER
RECORDS STATUS - ALL OTHER DIAGNOSIS      RECORDS RECEIVED FROM: [facility]   NOTES STATUS DETAILS   OFFICE NOTE from referring provider Epic 2/4/2025 - Dr. Mago Perez   OFFICE NOTE from medical oncologist Epic 3/18/2020 - Dr. Gomez Barnes   MEDICATION LIST [facility]    LABS     PATHOLOGY REPORTS     ANYTHING RELATED TO DIAGNOSIS Epic 2/4/2025

## 2025-02-18 ENCOUNTER — PRE VISIT (OUTPATIENT)
Dept: ONCOLOGY | Facility: CLINIC | Age: 20
End: 2025-02-18
Payer: COMMERCIAL

## 2025-02-18 ENCOUNTER — VIRTUAL VISIT (OUTPATIENT)
Dept: ONCOLOGY | Facility: CLINIC | Age: 20
End: 2025-02-18
Attending: INTERNAL MEDICINE
Payer: COMMERCIAL

## 2025-02-18 VITALS — BODY MASS INDEX: 23.92 KG/M2 | HEIGHT: 62 IN | WEIGHT: 130 LBS

## 2025-02-18 DIAGNOSIS — E80.0 EPP (ERYTHROPOIETIC PROTOPORPHYRIA) (H): Primary | Chronic | ICD-10-CM

## 2025-02-18 PROCEDURE — 98002 SYNCH AUDIO-VIDEO NEW MOD 45: CPT | Mod: GC | Performed by: INTERNAL MEDICINE

## 2025-02-18 ASSESSMENT — PAIN SCALES - GENERAL: PAINLEVEL_OUTOF10: NO PAIN (0)

## 2025-02-18 NOTE — PROGRESS NOTES
"Virtual Visit Details    Type of service:  Video Visit   Video Start Time: {video visit start/end time for provider to select:409300}  Video End Time:{video visit start/end time for provider to select:118305}    Originating Location (pt. Location): {video visit patient location:746446::\"Home\"}  {PROVIDER LOCATION On-site should be selected for visits conducted from your clinic location or adjoining [facility] hospital, academic office, or other nearby [facility] building. Off-site should be selected for all other provider locations, including home:780477}  Distant Location (provider location):  {virtual location provider:069742}  Platform used for Video Visit: {Virtual Visit Platforms:438205::\"[employer]\"}  "

## 2025-02-18 NOTE — PROGRESS NOTES
[facility] Hematology Consultation  [address] [place] [address]  Phone: [phone]    Outpatient Visit Note:    Patient: Hemalahta Driscoll  MRN: [id number]  : 2005  ELROY: 2025     Reason for Consultation:  Hemalatha Driscoll is a referred by her PCP for evaluation and treatment of EPP.    Assessment: Hemalatha Driscoll is a 19 year old woman with extremely elevated erythrocyte protoporphyrin with concurrent elevated plasma protoporphyrins, along with constellation of typical EPP symptoms of extreme sun sensitivity, FH and gallstones, all consistent with a diagnosis of EPP.      Also noted to have low ferritin and iron stores along with microcytic picture on CBC consistent with iron deficiency anemia.  In addition she has GI symptoms not related to EPP so we discussed GI referral.    Recommendations:  I counseled the patient about my assessment of EPP.   Discussed potential treatment options such as Afamelanotide (risks, benefits, expected course)  Will need to establish care with GI physician due to mild transaminitis which could be secondary to EPP  Will need annual Liver US.   Will need annual CBC with platelets and diff, Liver function test, iron studies.   Will provide information for enrolling in afamelanotide program. Patient would like to think a bit more about that.   Discussed OTC cimetidine and instructions sent by [facility] in 3 months.       Patient was evaluated with Dr. Logan.     Alexsander Solitario MD  Hematology/Oncology/BMT Fellow PGY4  Pager: [phone]    Physician Attestation   I saw this patient with the resident and agree with the resident s findings and plan of care as documented in the resident s note.      Briefly, Hemalatha is a 19 year old woman with EEP based on extremely elevated RBC protoporphyrins (plus plasma protos) and sun sensitivity, gall stones with mild transaminitis.  She is a good candidate for afamelanotide. She is concerned about logistics  since she lives 4 hours from this clinic.  She is interested in seeing GI as well so we discussed trying to coordinate a visit the same day.  I told her about the [facility] website and cimetidine as an alternative therapy via [employer] today so dosing was documented.  I'll check in with her in 3 months.    50 minutes spent by me on the date of the encounter doing chart review, review of outside records, review of test results, interpretation of tests, patient visit, and documentation     Kong Logan MD  Associate Professor of Medicine, Division of Hematology, Oncology and Transplantation  [employer]       -------------------------------  History: Hemalatha Driscoll is a 19 year old woman with essentially a lifelong history of EPP with extreme sun sensitivity who presents to establish care and discuss afamelanotide.  Patient was told around age 1 that she has some kind hematological disorder. She has had sun sensitivity all her life.  Over the course of past 2 years she has noted increased tingling and redness and hives on any sun exposed area.  Hives are followed by a sensation of burning and pain. She also has chronic headaches, nausea, abdominal pain. Intermittent changes appetite, and longstanding diarrhea of unknown etiology.  She had a cholecystectomy about 2 years ago for stones.    Medical history is notable for anxiety, chronic headaches, chronic diarrhea of unknown etiology, iron deficiency.    Surgical history is reviewed in the EMR     Medications are reviewed in the EMR and are noteable for iron supplementation.     Family history is notable for EPP in father's cousin    Physical Exam:  Patient was examined via virtual visit.     Exam:   Gen: Appears well, no distress  HEENT: no scleral icterus or hemorrhage, no wet purpura, no lymphadenopathy  CV: regular, no murmurs  Pulm: clear  Abd: soft, nontender, no splenomegaly  Ext: no edema  Skin: no ecchymoses or hematomas  Neuro: no  focal deficits, affect and cognition are normal    Labs:     Latest Reference Range & Units 02/12/20 12:50   Erythrocyte Porphyrin 0 - 35 ug/dL 1,844 (H)   (H): Data is abnormally high           Latest Reference Range & Units 03/11/24 11:21 06/18/24 16:21 02/04/25 14:51   WBC 4.0 - 11.0 10e3/uL 7.7 8.6 7.6   Hemoglobin 11.7 - 15.7 g/dL 13.4 13.6 13.7   Hematocrit 35.0 - 47.0 % 42.0 41.2 40.6   Platelet Count 150 - 450 10e3/uL 126 (L) 135 (L) 169   RBC Count 3.80 - 5.20 10e6/uL 5.35 (H) 5.37 (H) 5.34 (H)   MCV 78 - 100 fL 79 77 (L) 76 (L)   MCH 26.5 - 33.0 pg 25.0 (L) 25.3 (L) 25.7 (L)   MCHC 31.5 - 36.5 g/dL 31.9 33.0 33.7   RDW 10.0 - 15.0 % 16.5 (H) 17.2 (H) 15.9 (H)   (L): Data is abnormally low  (H): Data is abnormally high   Latest Reference Range & Units 02/12/20 12:50 03/27/23 16:41 02/04/25 14:51   Ferritin 6 - 175 ng/mL 6 (L) 22 29   (L): Data is abnormally low     Latest Reference Range & Units 03/11/24 11:21 06/18/24 16:21 02/04/25 14:51   Iron 37 - 145 ug/dL 66 44 51   Iron Binding Capacity 240 - 430 ug/dL 337 342 347   Iron Sat Index 15 - 46 % 20 13 (L) 15   (L): Data is abnormally low      Imaging:  Reviewed     Video-Visit Details:    Type of service:  Video Visit  Originating Location (pt. Location): Home  Distant Location (provider location):  [facility]   Mode of Communication:  Video Conference via [employer]

## 2025-02-18 NOTE — LETTER
"2025      Hemalatha Driscoll  [address] [facility] [address]      Dear Colleague,    Thank you for referring your patient, Hemalatha Driscoll, to the [facility]. Please see a copy of my visit note below.    Virtual Visit Details    Type of service:  Video Visit   Video Start Time: {video visit start/end time for provider to select:513327}  Video End Time:{video visit start/end time for provider to select:839667}    Originating Location (pt. Location): {video visit patient location:573870::\"Home\"}  {PROVIDER LOCATION On-site should be selected for visits conducted from your clinic location or adjoining [facility] hospital, academic office, or other nearby [facility] building. Off-site should be selected for all other provider locations, including home:237[address]}  Distant Location (provider location):  {virtual location provider:950928}  Platform used for Video Visit: {Virtual Visit Platforms:902977::\"Charitas\"}        [facility] Hematology Consultation  [address] [place] [id number]  Phone: [phone]    Outpatient Visit Note:    Patient: Hemalatha Driscoll  MRN: [id number]  : 2005  ELROY: 2025     Reason for Consultation:  Hemalatha Driscoll is a referred by her PCP for evaluation and treatment of EPP.    Assessment: Hemalatha Driscoll is a 19 year old woman with extremely elevated erythrocyte protoporphyrin with concurrent elevated plasma protoporphyrins, along with constellation of typical EPP symptoms of extreme sun sensitivity, FH and gallstones, all consistent with a diagnosis of EPP.      Also noted to have low ferritin and iron stores along with microcytic picture on CBC consistent with iron deficiency anemia.  In addition she has GI symptoms not related to EPP so we discussed GI referral.    Recommendations:  I counseled the patient about my assessment of EPP.   Discussed potential treatment options such as Afamelanotide (risks, benefits, expected course)  Will need " to establish care with GI physician due to mild transaminitis which could be secondary to EPP  Will need annual Liver US.   Will need annual CBC with platelets and diff, Liver function test, iron studies.   Will provide information for enrolling in afamelanotide program. Patient would like to think a bit more about that.   Discussed OTC cimetidine and instructions sent by [facility] in 3 months.       Patient was evaluated with Dr. Logan.     Alexsander Solitario MD  Hematology/Oncology/BMT Fellow PGY4  Pager: [phone]    Physician Attestation  I saw this patient with the resident and agree with the resident s findings and plan of care as documented in the resident s note.      Briefly, Hemalatha is a 19 year old woman with EEP based on extremely elevated RBC protoporphyrins (plus plasma protos) and sun sensitivity, gall stones with mild transaminitis.  She is a good candidate for afamelanotide. She is concerned about logistics since she lives 4 hours from this clinic.  She is interested in seeing GI as well so we discussed trying to coordinate a visit the same day.  I told her about the [employer] website and cimetidine as an alternative therapy via [facility] today so dosing was documented.  I'll check in with her in 3 months.    50 minutes spent by me on the date of the encounter doing chart review, review of outside records, review of test results, interpretation of tests, patient visit, and documentation     Kong Logan MD  Associate Professor of Medicine, Division of Hematology, Oncology and Transplantation  [employer]       -------------------------------  History: Hemalatha Driscoll is a 19 year old woman with essentially a lifelong history of EPP with extreme sun sensitivity who presents to establish care and discuss afamelanotide.  Patient was told around age 1 that she has some kind hematological disorder. She has had sun sensitivity all her life.  Over the course of past 2 years she  has noted increased tingling and redness and hives on any sun exposed area.  Hives are followed by a sensation of burning and pain. She also has chronic headaches, nausea, abdominal pain. Intermittent changes appetite, and longstanding diarrhea of unknown etiology.  She had a cholecystectomy about 2 years ago for stones.    Medical history is notable for anxiety, chronic headaches, chronic diarrhea of unknown etiology, iron deficiency.    Surgical history is reviewed in the EMR     Medications are reviewed in the EMR and are noteable for iron supplementation.     Family history is notable for EPP in father's cousin    Physical Exam:  Patient was examined via virtual visit.     Exam:   Gen: Appears well, no distress  HEENT: no scleral icterus or hemorrhage, no wet purpura, no lymphadenopathy  CV: regular, no murmurs  Pulm: clear  Abd: soft, nontender, no splenomegaly  Ext: no edema  Skin: no ecchymoses or hematomas  Neuro: no focal deficits, affect and cognition are normal    Labs:     Latest Reference Range & Units 02/12/20 12:50   Erythrocyte Porphyrin 0 - 35 ug/dL 1,844 (H)   (H): Data is abnormally high           Latest Reference Range & Units 03/11/24 11:21 06/18/24 16:21 02/04/25 14:51   WBC 4.0 - 11.0 10e3/uL 7.7 8.6 7.6   Hemoglobin 11.7 - 15.7 g/dL 13.4 13.6 13.7   Hematocrit 35.0 - 47.0 % 42.0 41.2 40.6   Platelet Count 150 - 450 10e3/uL 126 (L) 135 (L) 169   RBC Count 3.80 - 5.20 10e6/uL 5.35 (H) 5.37 (H) 5.34 (H)   MCV 78 - 100 fL 79 77 (L) 76 (L)   MCH 26.5 - 33.0 pg 25.0 (L) 25.3 (L) 25.7 (L)   MCHC 31.5 - 36.5 g/dL 31.9 33.0 33.7   RDW 10.0 - 15.0 % 16.5 (H) 17.2 (H) 15.9 (H)   (L): Data is abnormally low  (H): Data is abnormally high   Latest Reference Range & Units 02/12/20 12:50 03/27/23 16:41 02/04/25 14:51   Ferritin 6 - 175 ng/mL 6 (L) 22 29   (L): Data is abnormally low     Latest Reference Range & Units 03/11/24 11:21 06/18/24 16:21 02/04/25 14:51   Iron 37 - 145 ug/dL 66 44 51   Iron Binding  Capacity 240 - 430 ug/dL 337 342 347   Iron Sat Index 15 - 46 % 20 13 (L) 15   (L): Data is abnormally low      Imaging:  Reviewed       Again, thank you for allowing me to participate in the care of your patient.        Sincerely,        Kong Logan MD    Electronically signed

## 2025-02-18 NOTE — NURSING NOTE
Current patient location: [address] [facility] [address]    Is the patient currently in the state of [place]? YES    Visit mode: VIDEO    If the visit is dropped, the patient can be reconnected by:VIDEO VISIT: Text to cell phone:   Telephone Information:   Mobile [phone]       Will anyone else be joining the visit? NO  (If patient encounters technical issues they should call [phone])    Are changes needed to the allergy or medication list? Pt stated no changes to allergies and Pt stated no med changes    Are refills needed on medications prescribed by this physician? NO    Rooming Documentation:  Not applicable    Reason for visit: Consult    Liz RANDALL

## 2025-03-13 ENCOUNTER — MYC MEDICAL ADVICE (OUTPATIENT)
Dept: FAMILY MEDICINE | Facility: OTHER | Age: 20
End: 2025-03-13

## 2025-03-13 DIAGNOSIS — R11.0 CHRONIC NAUSEA: Primary | ICD-10-CM

## 2025-03-13 DIAGNOSIS — F41.1 GAD (GENERALIZED ANXIETY DISORDER): Chronic | ICD-10-CM

## 2025-03-13 RX ORDER — HYDROXYZINE HYDROCHLORIDE 25 MG/1
25 TABLET, FILM COATED ORAL
Qty: 30 TABLET | Refills: 2 | Status: SHIPPED | OUTPATIENT
Start: 2025-03-13

## 2025-03-13 RX ORDER — ONDANSETRON 4 MG/1
4 TABLET, ORALLY DISINTEGRATING ORAL EVERY 8 HOURS PRN
Qty: 12 TABLET | Refills: 0 | Status: SHIPPED | OUTPATIENT
Start: 2025-03-13

## 2025-04-04 DIAGNOSIS — F41.1 GAD (GENERALIZED ANXIETY DISORDER): Chronic | ICD-10-CM

## 2025-04-04 DIAGNOSIS — F33.1 MODERATE EPISODE OF RECURRENT MAJOR DEPRESSIVE DISORDER (H): ICD-10-CM

## 2025-04-07 RX ORDER — CITALOPRAM HYDROBROMIDE 40 MG/1
40 TABLET ORAL DAILY
Qty: 30 TABLET | Refills: 5 | Status: SHIPPED | OUTPATIENT
Start: 2025-04-07

## 2025-04-07 NOTE — TELEPHONE ENCOUNTER
Celexa       Last Written Prescription Date:  2/21/25  Last Fill Quantity: 30,   # refills: 0  Last Office Visit: 2/4/25  Future Office visit:       Routing refill request to provider for review/approval because:      6/18/2024     3:37 PM 9/13/2024     1:46 PM 2/4/2025     1:35 PM   PHQ   PHQ-9 Total Score 16 17 14    Q9: Thoughts of better off dead/self-harm past 2 weeks Not at all Not at all Not at all       Patient-reported         4/18/2024     2:11 PM 6/18/2024     3:40 PM 2/4/2025     1:39 PM   ELIZABETH-7 SCORE   Total Score 20 (severe anxiety) 17 (severe anxiety) 13 (moderate anxiety)   Total Score 20 17 13        Patient-reported

## 2025-04-27 DIAGNOSIS — K21.9 GASTROESOPHAGEAL REFLUX DISEASE WITHOUT ESOPHAGITIS: ICD-10-CM

## 2025-04-28 RX ORDER — OMEPRAZOLE 20 MG/1
20 CAPSULE, DELAYED RELEASE ORAL DAILY
Qty: 30 CAPSULE | Refills: 3 | Status: SHIPPED | OUTPATIENT
Start: 2025-04-28

## 2025-05-18 DIAGNOSIS — R11.0 CHRONIC NAUSEA: ICD-10-CM

## 2025-05-19 RX ORDER — ONDANSETRON 4 MG/1
4 TABLET, ORALLY DISINTEGRATING ORAL EVERY 8 HOURS PRN
Qty: 12 TABLET | Refills: 0 | Status: SHIPPED | OUTPATIENT
Start: 2025-05-19

## 2025-07-21 ENCOUNTER — OFFICE VISIT (OUTPATIENT)
Dept: FAMILY MEDICINE | Facility: OTHER | Age: 20
End: 2025-07-21
Attending: STUDENT IN AN ORGANIZED HEALTH CARE EDUCATION/TRAINING PROGRAM
Payer: COMMERCIAL

## 2025-07-21 VITALS
OXYGEN SATURATION: 100 % | TEMPERATURE: 97.2 F | BODY MASS INDEX: 26.57 KG/M2 | HEIGHT: 62 IN | WEIGHT: 144.4 LBS | SYSTOLIC BLOOD PRESSURE: 100 MMHG | DIASTOLIC BLOOD PRESSURE: 68 MMHG | HEART RATE: 77 BPM

## 2025-07-21 DIAGNOSIS — L70.0 CYSTIC ACNE: ICD-10-CM

## 2025-07-21 DIAGNOSIS — N91.4 SECONDARY OLIGOMENORRHEA: ICD-10-CM

## 2025-07-21 DIAGNOSIS — L68.0 HIRSUTISM: ICD-10-CM

## 2025-07-21 DIAGNOSIS — N92.6 IRREGULAR MENSES: ICD-10-CM

## 2025-07-21 DIAGNOSIS — E80.0 EPP (ERYTHROPOIETIC PROTOPORPHYRIA) (H): Primary | ICD-10-CM

## 2025-07-21 DIAGNOSIS — E61.1 IRON DEFICIENCY: ICD-10-CM

## 2025-07-21 DIAGNOSIS — K21.9 GASTROESOPHAGEAL REFLUX DISEASE WITHOUT ESOPHAGITIS: ICD-10-CM

## 2025-07-21 DIAGNOSIS — R74.01 TRANSAMINITIS: ICD-10-CM

## 2025-07-21 PROCEDURE — G0463 HOSPITAL OUTPT CLINIC VISIT: HCPCS

## 2025-07-21 RX ORDER — FERROUS SULFATE 325(65) MG
TABLET ORAL
Qty: 90 TABLET | Refills: 3 | Status: SHIPPED | OUTPATIENT
Start: 2025-07-21

## 2025-07-21 RX ORDER — OMEPRAZOLE 20 MG/1
20 CAPSULE, DELAYED RELEASE ORAL DAILY
Qty: 30 CAPSULE | Refills: 3 | Status: SHIPPED | OUTPATIENT
Start: 2025-07-21

## 2025-07-21 ASSESSMENT — ANXIETY QUESTIONNAIRES
2. NOT BEING ABLE TO STOP OR CONTROL WORRYING: NEARLY EVERY DAY
4. TROUBLE RELAXING: MORE THAN HALF THE DAYS
6. BECOMING EASILY ANNOYED OR IRRITABLE: MORE THAN HALF THE DAYS
7. FEELING AFRAID AS IF SOMETHING AWFUL MIGHT HAPPEN: NOT AT ALL
IF YOU CHECKED OFF ANY PROBLEMS ON THIS QUESTIONNAIRE, HOW DIFFICULT HAVE THESE PROBLEMS MADE IT FOR YOU TO DO YOUR WORK, TAKE CARE OF THINGS AT HOME, OR GET ALONG WITH OTHER PEOPLE: NOT DIFFICULT AT ALL
GAD7 TOTAL SCORE: 15
7. FEELING AFRAID AS IF SOMETHING AWFUL MIGHT HAPPEN: NOT AT ALL
8. IF YOU CHECKED OFF ANY PROBLEMS, HOW DIFFICULT HAVE THESE MADE IT FOR YOU TO DO YOUR WORK, TAKE CARE OF THINGS AT HOME, OR GET ALONG WITH OTHER PEOPLE?: NOT DIFFICULT AT ALL
5. BEING SO RESTLESS THAT IT IS HARD TO SIT STILL: MORE THAN HALF THE DAYS
3. WORRYING TOO MUCH ABOUT DIFFERENT THINGS: NEARLY EVERY DAY
GAD7 TOTAL SCORE: 15
GAD7 TOTAL SCORE: 15
1. FEELING NERVOUS, ANXIOUS, OR ON EDGE: NEARLY EVERY DAY

## 2025-07-21 ASSESSMENT — PATIENT HEALTH QUESTIONNAIRE - PHQ9
SUM OF ALL RESPONSES TO PHQ QUESTIONS 1-9: 15
10. IF YOU CHECKED OFF ANY PROBLEMS, HOW DIFFICULT HAVE THESE PROBLEMS MADE IT FOR YOU TO DO YOUR WORK, TAKE CARE OF THINGS AT HOME, OR GET ALONG WITH OTHER PEOPLE: NOT DIFFICULT AT ALL
SUM OF ALL RESPONSES TO PHQ QUESTIONS 1-9: 15

## 2025-07-21 ASSESSMENT — PAIN SCALES - GENERAL: PAINLEVEL_OUTOF10: NO PAIN (0)

## 2025-07-21 NOTE — PROGRESS NOTES
Assessment & Plan     EPP (erythropoietic protoporphyria) (H)  Reviewed oncology/hematology note.  Recommended ultrasound, ordered this.  Discussed she needs to schedule follow-up with them, overdue for 3-month follow-up in May.  Will update iron levels, not compliant with her oral iron.  - US Abdomen Limited; Future  - Adult GI  Referral - Consult Only; Future  - Ferritin; Future  - CBC with platelets and differential; Future    Transaminitis  Thought related to her EPP.  Will update levels with next labs.  GI referral placed as recommended by hematology  - US Abdomen Limited; Future  - Adult GI  Referral - Consult Only; Future    Cystic acne  Hirsutism  Irregular menses  Secondary oligomenorrhea  Possible PCOS.  Will update day 3 or 4 menstrual cycle labs.  Test for insulin resistance as well.  Will also get pelvic ultrasound to evaluate for polycystic ovaries.  If confirms, she would be open to trial of metformin.  Discussed options for hirsutism and acne, will discuss further at follow-up pending all testosterone and androgen testing.  Will plan follow-up in 1 month to further review data as well.  - Testosterone total; Future  - DHEA sulfate; Future  - Follicle stimulating hormone; Future  - Luteinizing Hormone; Future  - Mullerian Hormone Antibody; Future  - Prolactin; Future  - 17 OH progesterone; Future  - Androstenedione; Future  - TSH with free T4 reflex; Future  - Glucose; Future  - Comprehensive metabolic panel (BMP + Alb, Alk Phos, ALT, AST, Total. Bili, TP); Future  - US Pelvic Complete with Transvaginal; Future    Gastroesophageal reflux disease without esophagitis  - omeprazole (PRILOSEC) 20 MG DR capsule; Take 1 capsule (20 mg) by mouth daily.    Iron deficiency  - ferrous sulfate (SV IRON) 325 (65 Fe) MG tablet; TAKE 1 TABLET BY MOUTH EVERY OTHER DAY --  DO  NOT  TAKE  WITH  PPI  OR  ANTACID        The longitudinal plan of care for the diagnosis(es)/condition(s) as documented  were addressed during this visit. Due to the added complexity in care, I will continue to support Hemalatha in the subsequent management and with ongoing continuity of care.      Subjective   Hemalatha is a 20 year old, presenting for the following health issues:  hormones and Menstrual Problem        7/21/2025     7:44 AM   Additional Questions   Roomed by Maryse JAUREGUI   Accompanied by self     History of Present Illness       Reason for visit:  Concern about pcos  Symptom onset:  More than a month  Symptoms include:  Hair loss,weightgain,always tired/cant sleeo at night,cystic acne, always hot,irregular periods,nver feel full,facial hair  Symptom intensity:  Moderate  Symptom progression:  Staying the same  Had these symptoms before:  Yes  Has tried/received treatment for these symptoms:  No  What makes it worse:  No  What makes it better:  No She is missing 1 dose(s) of medications per week.  She is not taking prescribed medications regularly due to remembering to take.      EPP virtual appointment 2/18/25.   Wasn't satsified with visit.   No follow up with GI.   No liver ultrasound yet.     Concern - hormone issues   Onset: has been going on for a while but never thought about it- has all added up now   Description: irregular periods, a lot of pain in ovary area, hair loss, always tired and can't sleep at night, cystic acne, no sex drive, always feels hot, hair loss  Intensity: moderate  Progression of Symptoms:  same  Accompanying Signs & Symptoms: see above   Previous history of similar problem: yes   Precipitating factors:        Worsened by: none   Alleviating factors:        Improved by: none   Therapies tried and outcome: None    Menorrhagia, dysmenorrhea.   Starting to get some facial hair. Dark/terminal. Cystic acne.  Became more prone to acne a few months ago.      Periods irregular since she started getting them.   Periods are 20-21 days apart.   They can be >35 days apart  Very inconsistent.   No one in family  "with PCOS  Last period 6/30/25  No history of birth control   Some pain with sex   Feels like blood sugar has been higher     Concern - irregular periods   Onset: a while   Description: not consistent, either doesn't get for a while or gets twice a month, using davis citlali   Intensity: mild  Progression of Symptoms:  worsening more pain in ovary area   Accompanying Signs & Symptoms: pain in ovary area, doesn't last long, heavy and painful   Previous history of similar problem: yes   Precipitating factors:        Worsened by: none   Alleviating factors:        Improved by: ibuprofen   Therapies tried and outcome: ibuprofen           Objective    /68 (BP Location: Left arm, Patient Position: Sitting, Cuff Size: Adult Regular)   Pulse 77   Temp 97.2  F (36.2  C) (Tympanic)   Ht 1.575 m (5' 2\")   Wt 65.5 kg (144 lb 6.4 oz)   LMP  (LMP Unknown)   SpO2 100%   BMI 26.41 kg/m    Body mass index is 26.41 kg/m .    Physical Exam  Constitutional:       General: She is not in acute distress.     Appearance: Normal appearance. She is not ill-appearing.   Pulmonary:      Effort: Pulmonary effort is normal.   Abdominal:      General: Bowel sounds are normal. There is no distension.      Palpations: Abdomen is soft. There is no mass.      Tenderness: There is no abdominal tenderness. There is no guarding or rebound.   Skin:     Comments: No large acne now, some hyperpigmented scaring.   No dark terminal hairs on chin or umbilicus right now, recently shaved.    Neurological:      Mental Status: She is alert.                    Signed Electronically by: Mago Perez MD    "

## 2025-07-21 NOTE — PATIENT INSTRUCTIONS
Should follow up with EPP specialist at the .   Referral to GI at the   Radiology will call for the ultrasound   NEED DAY 3-4 OF NEXT PERIOD LABS FASTING (NO FOOD FOR 8 HOURS)  Need to start taking iron.

## 2025-07-25 ENCOUNTER — TELEPHONE (OUTPATIENT)
Dept: FAMILY MEDICINE | Facility: OTHER | Age: 20
End: 2025-07-25

## 2025-08-11 ENCOUNTER — RESULTS FOLLOW-UP (OUTPATIENT)
Dept: FAMILY MEDICINE | Facility: OTHER | Age: 20
End: 2025-08-11

## 2025-08-11 ENCOUNTER — HOSPITAL ENCOUNTER (OUTPATIENT)
Dept: ULTRASOUND IMAGING | Facility: HOSPITAL | Age: 20
Discharge: HOME | End: 2025-08-11
Attending: STUDENT IN AN ORGANIZED HEALTH CARE EDUCATION/TRAINING PROGRAM | Admitting: STUDENT IN AN ORGANIZED HEALTH CARE EDUCATION/TRAINING PROGRAM
Payer: COMMERCIAL

## 2025-08-11 DIAGNOSIS — L68.0 HIRSUTISM: ICD-10-CM

## 2025-08-11 DIAGNOSIS — N91.4 SECONDARY OLIGOMENORRHEA: ICD-10-CM

## 2025-08-11 DIAGNOSIS — N92.6 IRREGULAR MENSES: ICD-10-CM

## 2025-08-11 DIAGNOSIS — L70.0 CYSTIC ACNE: ICD-10-CM

## 2025-08-11 PROCEDURE — 76830 TRANSVAGINAL US NON-OB: CPT

## 2025-08-21 ENCOUNTER — OFFICE VISIT (OUTPATIENT)
Dept: FAMILY MEDICINE | Facility: OTHER | Age: 20
End: 2025-08-21
Attending: STUDENT IN AN ORGANIZED HEALTH CARE EDUCATION/TRAINING PROGRAM
Payer: COMMERCIAL

## 2025-08-21 VITALS
SYSTOLIC BLOOD PRESSURE: 108 MMHG | TEMPERATURE: 97.6 F | HEIGHT: 62 IN | DIASTOLIC BLOOD PRESSURE: 62 MMHG | BODY MASS INDEX: 26.76 KG/M2 | WEIGHT: 145.4 LBS | OXYGEN SATURATION: 99 % | HEART RATE: 81 BPM

## 2025-08-21 DIAGNOSIS — N89.8 VAGINAL DISCHARGE: ICD-10-CM

## 2025-08-21 DIAGNOSIS — E28.2 PCOS (POLYCYSTIC OVARIAN SYNDROME): ICD-10-CM

## 2025-08-21 DIAGNOSIS — N92.6 IRREGULAR MENSES: ICD-10-CM

## 2025-08-21 DIAGNOSIS — E80.0 EPP (ERYTHROPOIETIC PROTOPORPHYRIA) (H): Chronic | ICD-10-CM

## 2025-08-21 DIAGNOSIS — F41.1 GAD (GENERALIZED ANXIETY DISORDER): Chronic | ICD-10-CM

## 2025-08-21 DIAGNOSIS — Z12.4 PAP SMEAR FOR CERVICAL CANCER SCREENING: Primary | ICD-10-CM

## 2025-08-21 PROBLEM — F33.1 MODERATE EPISODE OF RECURRENT MAJOR DEPRESSIVE DISORDER (H): Chronic | Status: ACTIVE | Noted: 2024-09-13

## 2025-08-21 LAB
BACTERIAL VAGINOSIS VAG-IMP: NEGATIVE
C TRACH DNA SPEC QL PROBE+SIG AMP: NEGATIVE
CANDIDA DNA VAG QL NAA+PROBE: NOT DETECTED
CANDIDA GLABRATA / CANDIDA KRUSEI DNA: NOT DETECTED
N GONORRHOEA DNA SPEC QL NAA+PROBE: NEGATIVE
SPECIMEN TYPE: NORMAL
T VAGINALIS DNA VAG QL NAA+PROBE: NOT DETECTED

## 2025-08-21 PROCEDURE — 87491 CHLMYD TRACH DNA AMP PROBE: CPT | Mod: ZL | Performed by: STUDENT IN AN ORGANIZED HEALTH CARE EDUCATION/TRAINING PROGRAM

## 2025-08-21 PROCEDURE — 81515 NFCT DS BV&VAGINITIS DNA ALG: CPT | Mod: ZL | Performed by: STUDENT IN AN ORGANIZED HEALTH CARE EDUCATION/TRAINING PROGRAM

## 2025-08-21 PROCEDURE — G0463 HOSPITAL OUTPT CLINIC VISIT: HCPCS

## 2025-08-21 RX ORDER — HYDROXYZINE HYDROCHLORIDE 25 MG/1
25 TABLET, FILM COATED ORAL
Qty: 90 TABLET | Refills: 3 | Status: SHIPPED | OUTPATIENT
Start: 2025-08-21

## 2025-08-21 RX ORDER — METFORMIN HYDROCHLORIDE 500 MG/1
500 TABLET, EXTENDED RELEASE ORAL
Qty: 90 TABLET | Refills: 1 | Status: SHIPPED | OUTPATIENT
Start: 2025-08-21

## 2025-08-21 ASSESSMENT — PAIN SCALES - GENERAL: PAINLEVEL_OUTOF10: MODERATE PAIN (4)

## 2025-08-27 LAB
BKR LAB AP GYN ADEQUACY: NORMAL
BKR LAB AP GYN INTERPRETATION: NORMAL
BKR LAB AP HPV REFLEX: NO
BKR LAB AP LMP: NORMAL
BKR LAB AP PREVIOUS ABNORMAL: NORMAL
PATH REPORT.COMMENTS IMP SPEC: NORMAL
PATH REPORT.COMMENTS IMP SPEC: NORMAL
PATH REPORT.RELEVANT HX SPEC: NORMAL